# Patient Record
Sex: FEMALE | Race: WHITE | URBAN - METROPOLITAN AREA
[De-identification: names, ages, dates, MRNs, and addresses within clinical notes are randomized per-mention and may not be internally consistent; named-entity substitution may affect disease eponyms.]

---

## 2018-06-22 ENCOUNTER — PROCEDURE VISIT (OUTPATIENT)
Dept: AUDIOLOGY | Age: 83
End: 2018-06-22

## 2018-06-22 DIAGNOSIS — H90.3 SENSORINEURAL HEARING LOSS, BILATERAL: Primary | ICD-10-CM

## 2018-06-22 PROCEDURE — 99999 PR OFFICE/OUTPT VISIT,PROCEDURE ONLY: CPT | Performed by: AUDIOLOGIST

## 2018-07-05 ENCOUNTER — PROCEDURE VISIT (OUTPATIENT)
Dept: AUDIOLOGY | Age: 83
End: 2018-07-05

## 2018-07-05 DIAGNOSIS — H90.3 SENSORINEURAL HEARING LOSS (SNHL) OF BOTH EARS: Primary | ICD-10-CM

## 2018-07-05 PROCEDURE — 99999 PR OFFICE/OUTPT VISIT,PROCEDURE ONLY: CPT | Performed by: AUDIOLOGIST

## 2019-06-20 ENCOUNTER — PROCEDURE VISIT (OUTPATIENT)
Dept: AUDIOLOGY | Age: 84
End: 2019-06-20

## 2019-06-20 DIAGNOSIS — H90.3 SENSORINEURAL HEARING LOSS, BILATERAL: Primary | ICD-10-CM

## 2019-06-20 PROCEDURE — 99999 PR OFFICE/OUTPT VISIT,PROCEDURE ONLY: CPT | Performed by: AUDIOLOGIST

## 2020-07-02 ENCOUNTER — PROCEDURE VISIT (OUTPATIENT)
Dept: AUDIOLOGY | Age: 85
End: 2020-07-02

## 2020-07-02 PROCEDURE — 99999 PR OFFICE/OUTPT VISIT,PROCEDURE ONLY: CPT | Performed by: AUDIOLOGIST

## 2020-07-16 ENCOUNTER — PROCEDURE VISIT (OUTPATIENT)
Dept: AUDIOLOGY | Age: 85
End: 2020-07-16

## 2020-07-16 PROCEDURE — MISCAUD MISC AUDIOLOGY SUPPLIES: Performed by: AUDIOLOGIST

## 2020-07-16 NOTE — PROGRESS NOTES
Patient seen for hearing aid speaker change. Patient changed to semi-soft domes. No other issues noted.

## 2020-11-05 ENCOUNTER — PROCEDURE VISIT (OUTPATIENT)
Dept: AUDIOLOGY | Age: 85
End: 2020-11-05

## 2020-11-05 PROCEDURE — 99999 PR OFFICE/OUTPT VISIT,PROCEDURE ONLY: CPT | Performed by: AUDIOLOGIST

## 2020-11-05 NOTE — PROGRESS NOTES
Patient seen for hearing aid check of left hearing aid. Hearing aid is dead. Will be sent for estimate. Will contact patient with pricing when received.

## 2020-11-12 ENCOUNTER — TELEPHONE (OUTPATIENT)
Dept: AUDIOLOGY | Age: 85
End: 2020-11-12

## 2020-11-12 NOTE — TELEPHONE ENCOUNTER
Left message regarding HA repair estimate:  $200 for repair w/ 6-month warranty  $250 for repair, w/ 12-month warranty    Will wait for approval from patient.

## 2020-11-19 ENCOUNTER — PROCEDURE VISIT (OUTPATIENT)
Dept: AUDIOLOGY | Age: 85
End: 2020-11-19

## 2020-11-19 PROCEDURE — MISCAUD MISC AUDIOLOGY SUPPLIES: Performed by: AUDIOLOGIST

## 2020-11-19 NOTE — PROGRESS NOTES
Patient seen for pickup of repaired left hearing aid. No issues noted. Patient is scheduled to return 1/2021 for a hearing evaluation and HA adjustments. Billing paid in full with N.

## 2021-01-21 ENCOUNTER — TELEPHONE (OUTPATIENT)
Dept: AUDIOLOGY | Age: 86
End: 2021-01-21

## 2021-02-01 ENCOUNTER — PROCEDURE VISIT (OUTPATIENT)
Dept: AUDIOLOGY | Age: 86
End: 2021-02-01
Payer: MEDICARE

## 2021-02-01 DIAGNOSIS — H90.3 SENSORINEURAL HEARING LOSS, BILATERAL: Primary | ICD-10-CM

## 2021-02-01 PROCEDURE — 92567 TYMPANOMETRY: CPT | Performed by: AUDIOLOGIST

## 2021-02-01 PROCEDURE — 92557 COMPREHENSIVE HEARING TEST: CPT | Performed by: AUDIOLOGIST

## 2021-02-01 NOTE — PROGRESS NOTES
This patient was referred for audiometric/tympanometric testing due to bilateral hearing loss. Patient reported possible worsening of her hearing loss. She is a current hearing aid wearer. She denied any dizziness. 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 Reliability was good. Speech reception thresholds were in good agreement with the pure tone averages, bilaterally. Speech discrimination scores were excellent, bilaterally. No significant change in hearing loss since previous test.     Tympanometry revealed normal middle ear peak pressure and compliance, in the left ear. No seal obtained, in the right ear. The results were reviewed with the patient. Recommendations for follow up will be made pending physician consult. Patient reported noticing too much background noise with hearing aids. Adjusted gain. She will follow up as needed.     Jeronimo Hopson [facility]  [id number]  Electronically signed by Jeronimo Hopson on 2/1/2021 at 3:26 PM

## 2021-11-30 ENCOUNTER — TELEPHONE (OUTPATIENT)
Dept: AUDIOLOGY | Age: 86
End: 2021-11-30

## 2021-12-02 ENCOUNTER — PROCEDURE VISIT (OUTPATIENT)
Dept: AUDIOLOGY | Age: 86
End: 2021-12-02

## 2021-12-02 DIAGNOSIS — H90.3 SENSORINEURAL HEARING LOSS, BILATERAL: ICD-10-CM

## 2021-12-02 PROCEDURE — 99024 POSTOP FOLLOW-UP VISIT: CPT | Performed by: AUDIOLOGIST

## 2021-12-17 ENCOUNTER — PROCEDURE VISIT (OUTPATIENT)
Dept: AUDIOLOGY | Age: 86
End: 2021-12-17

## 2021-12-17 DIAGNOSIS — H90.3 SENSORINEURAL HEARING LOSS, BILATERAL: ICD-10-CM

## 2021-12-17 PROCEDURE — MISCAUD MISC AUDIOLOGY SUPPLIES: Performed by: AUDIOLOGIST

## 2023-08-22 ENCOUNTER — HOSPITAL ENCOUNTER (OUTPATIENT)
Age: 88
Discharge: HOME | End: 2023-08-24

## 2023-08-22 PROCEDURE — 87081 CULTURE SCREEN ONLY: CPT

## 2023-08-24 LAB
MICROORGANISM SPEC CULT: NORMAL
SPECIMEN DESCRIPTION: NORMAL

## 2023-08-30 ENCOUNTER — HOSPITAL ENCOUNTER (OUTPATIENT)
Age: 88
Discharge: HOME | End: 2023-09-01

## 2023-08-30 LAB
ABO + RH BLD: NORMAL
ARM BAND NUMBER: NORMAL
BLOOD BANK SAMPLE EXPIRATION: NORMAL
BLOOD GROUP ANTIBODIES SERPL: NEGATIVE

## 2023-08-30 PROCEDURE — 86850 RBC ANTIBODY SCREEN: CPT

## 2023-08-30 PROCEDURE — 86901 BLOOD TYPING SEROLOGIC RH(D): CPT

## 2023-08-30 PROCEDURE — 86900 BLOOD TYPING SEROLOGIC ABO: CPT

## 2023-08-31 ENCOUNTER — HOSPITAL ENCOUNTER (OUTPATIENT)
Age: 88
Discharge: HOME | End: 2023-09-02
Payer: MEDICARE

## 2023-08-31 LAB
ANION GAP SERPL CALCULATED.3IONS-SCNC: 9 MMOL/L (ref 7–16)
BUN SERPL-MCNC: 17 MG/DL (ref 6–23)
CALCIUM SERPL-MCNC: 8.5 MG/DL (ref 8.6–10.2)
CHLORIDE SERPL-SCNC: 103 MMOL/L (ref 98–107)
CO2 SERPL-SCNC: 26 MMOL/L (ref 22–29)
CREAT SERPL-MCNC: 0.7 MG/DL (ref 0.5–1)
GFR SERPL CREATININE-BSD FRML MDRD: >60 ML/MIN/1.73M2
GLUCOSE SERPL-MCNC: 99 MG/DL (ref 74–99)
HCT VFR BLD AUTO: 39.8 % (ref 34–48)
HGB BLD-MCNC: 13.1 G/DL (ref 11.5–15.5)
POTASSIUM SERPL-SCNC: 4.4 MMOL/L (ref 3.5–5)
SODIUM SERPL-SCNC: 138 MMOL/L (ref 132–146)

## 2023-08-31 PROCEDURE — 85014 HEMATOCRIT: CPT

## 2023-08-31 PROCEDURE — 85018 HEMOGLOBIN: CPT

## 2023-08-31 PROCEDURE — 80048 BASIC METABOLIC PNL TOTAL CA: CPT

## 2023-09-01 ENCOUNTER — HOSPITAL ENCOUNTER (OUTPATIENT)
Age: 88
Discharge: HOME | End: 2023-09-01

## 2023-09-01 LAB
ANION GAP SERPL CALCULATED.3IONS-SCNC: 7 MMOL/L (ref 7–16)
BUN SERPL-MCNC: 13 MG/DL (ref 6–23)
CALCIUM SERPL-MCNC: 8.8 MG/DL (ref 8.6–10.2)
CHLORIDE SERPL-SCNC: 102 MMOL/L (ref 98–107)
CO2 SERPL-SCNC: 30 MMOL/L (ref 22–29)
CREAT SERPL-MCNC: 0.6 MG/DL (ref 0.5–1)
GFR SERPL CREATININE-BSD FRML MDRD: >60 ML/MIN/1.73M2
GLUCOSE SERPL-MCNC: 109 MG/DL (ref 74–99)
HCT VFR BLD AUTO: 36.7 % (ref 34–48)
HGB BLD-MCNC: 12.1 G/DL (ref 11.5–15.5)
POTASSIUM SERPL-SCNC: 4.2 MMOL/L (ref 3.5–5)
SODIUM SERPL-SCNC: 139 MMOL/L (ref 132–146)

## 2023-09-01 PROCEDURE — 80048 BASIC METABOLIC PNL TOTAL CA: CPT

## 2023-09-01 PROCEDURE — 85018 HEMOGLOBIN: CPT

## 2023-09-01 PROCEDURE — 85014 HEMATOCRIT: CPT

## 2023-10-13 ENCOUNTER — HOSPITAL ENCOUNTER (EMERGENCY)
Age: 88
Discharge: HOME | End: 2023-10-13
Payer: MEDICARE

## 2023-10-13 VITALS
HEIGHT: 67 IN | OXYGEN SATURATION: 99 % | WEIGHT: 172 LBS | SYSTOLIC BLOOD PRESSURE: 144 MMHG | RESPIRATION RATE: 20 BRPM | TEMPERATURE: 98.7 F | BODY MASS INDEX: 27 KG/M2 | HEART RATE: 80 BPM | DIASTOLIC BLOOD PRESSURE: 67 MMHG

## 2023-10-13 DIAGNOSIS — N39.0 URINARY TRACT INFECTION WITH HEMATURIA, SITE UNSPECIFIED: Primary | ICD-10-CM

## 2023-10-13 DIAGNOSIS — R31.9 URINARY TRACT INFECTION WITH HEMATURIA, SITE UNSPECIFIED: Primary | ICD-10-CM

## 2023-10-13 LAB
BACTERIA URNS QL MICRO: ABNORMAL
BILIRUB UR QL STRIP: ABNORMAL
CLARITY UR: ABNORMAL
COLOR UR: YELLOW
GLUCOSE UR STRIP-MCNC: NEGATIVE MG/DL
HGB UR QL STRIP.AUTO: ABNORMAL
KETONES UR STRIP-MCNC: NEGATIVE MG/DL
LEUKOCYTE ESTERASE UR QL STRIP: NEGATIVE
NITRITE UR QL STRIP: NEGATIVE
PH UR STRIP: 5.5 [PH] (ref 5–9)
PROT UR STRIP-MCNC: ABNORMAL MG/DL
RBC #/AREA URNS HPF: ABNORMAL /HPF
SP GR UR STRIP: >1.03 (ref 1–1.03)
UROBILINOGEN UR STRIP-ACNC: 0.2 EU/DL (ref 0–1)
WBC #/AREA URNS HPF: ABNORMAL /HPF

## 2023-10-13 PROCEDURE — 81001 URINALYSIS AUTO W/SCOPE: CPT

## 2023-10-13 PROCEDURE — 87086 URINE CULTURE/COLONY COUNT: CPT

## 2023-10-13 PROCEDURE — 99211 OFF/OP EST MAY X REQ PHY/QHP: CPT

## 2023-10-13 PROCEDURE — 87077 CULTURE AEROBIC IDENTIFY: CPT

## 2023-10-13 RX ORDER — GABAPENTIN 300 MG/1
300 CAPSULE ORAL 2 TIMES DAILY
COMMUNITY

## 2023-10-13 RX ORDER — NITROFURANTOIN 25; 75 MG/1; MG/1
100 CAPSULE ORAL 2 TIMES DAILY
Qty: 10 CAPSULE | Refills: 0 | Status: SHIPPED | OUTPATIENT
Start: 2023-10-13 | End: 2023-10-18

## 2023-10-13 ASSESSMENT — PAIN - FUNCTIONAL ASSESSMENT: PAIN_FUNCTIONAL_ASSESSMENT: 0-10

## 2023-10-13 ASSESSMENT — PAIN SCALES - GENERAL: PAINLEVEL_OUTOF10: 0

## 2023-10-13 NOTE — DISCHARGE INSTRUCTIONS
You have microscopic blood in your urine, follow-up with your doctor when you finish the antibiotic to be sure that is resolved if not you may need further testing.

## 2023-10-13 NOTE — ED PROVIDER NOTES
non toxic and appropriate for outpatient management. She is allergic to most antibiotics she said the only thing that they can give her when she has a UTI is Macrobid. I did review her labs she does had kidney function test done in September and they were normal so I did order the [employer] [address] for her. Plan of Care/Counseling:  {Specialits:01766::I} reviewed today's visit with the {Persons; Family Members:83148} in addition to providing specific details for the plan of care and counseling regarding the diagnosis and prognosis. Questions are answered at this time and are agreeable with the plan. Assessment      1. Urinary tract infection with hematuria, site unspecified      Plan   Disposition: Discharge to home and advised to contact Butch Kaur MD  [facility] [phone]    Schedule an appointment as soon as possible for a visit      Patient condition is good    New Medications     New Prescriptions    NITROFURANTOIN, MACROCRYSTAL-MONOHYDRATE, (MACROBID) 100 MG CAPSULE    Take 1 capsule by mouth 2 times daily for 5 days     Electronically signed by ELYSSA Salgado CNP   DD: 10/13/23  **This report was transcribed using voice recognition software. Every effort was made to ensure accuracy; however, inadvertent computerized transcription errors may be present.   END OF ED PROVIDER NOTE

## 2023-10-15 LAB
MICROORGANISM SPEC CULT: ABNORMAL
SPECIMEN DESCRIPTION: ABNORMAL

## 2023-10-16 LAB
MICROORGANISM SPEC CULT: ABNORMAL
MICROORGANISM SPEC CULT: ABNORMAL
SPECIMEN DESCRIPTION: ABNORMAL

## 2023-10-16 NOTE — ED NOTES
Reviewed patients after hours culture results. Urine culture growing ESCHERICHIA COLI, patient discharged on nitrofurantoin. No need for further intervention at this time.     Joao Tao, PharmD, BCPS 10/16/2023 3:02 PM   [phone]

## 2024-09-16 ENCOUNTER — PROCEDURE VISIT (OUTPATIENT)
Dept: AUDIOLOGY | Age: 89
End: 2024-09-16

## 2024-09-16 DIAGNOSIS — H90.3 SENSORY HEARING LOSS, BILATERAL: Primary | ICD-10-CM

## 2024-09-16 PROCEDURE — 99024 POSTOP FOLLOW-UP VISIT: CPT

## 2025-01-23 ENCOUNTER — PROCEDURE VISIT (OUTPATIENT)
Dept: AUDIOLOGY | Age: 89
End: 2025-01-23

## 2025-01-23 DIAGNOSIS — H90.3 SENSORY HEARING LOSS, BILATERAL: Primary | ICD-10-CM

## 2025-01-23 NOTE — PROGRESS NOTES
Patient's son arrived to have right HA  changed. Replaced  and patient's son was taken to  to make payment of $100 for out-of-warranty  repair. Will return for services PRN.    Electronically signed by Jeronimo Higuera on 1/23/2025 at 2:39 PM

## 2025-03-17 ENCOUNTER — APPOINTMENT (OUTPATIENT)
Dept: CT IMAGING | Age: 89
DRG: 853 | End: 2025-03-17
Payer: MEDICARE

## 2025-03-17 ENCOUNTER — ANESTHESIA (OUTPATIENT)
Dept: OPERATING ROOM | Age: 89
End: 2025-03-17
Payer: MEDICARE

## 2025-03-17 ENCOUNTER — ANESTHESIA EVENT (OUTPATIENT)
Dept: OPERATING ROOM | Age: 89
End: 2025-03-17
Payer: MEDICARE

## 2025-03-17 ENCOUNTER — APPOINTMENT (OUTPATIENT)
Dept: GENERAL RADIOLOGY | Age: 89
DRG: 853 | End: 2025-03-17
Payer: MEDICARE

## 2025-03-17 ENCOUNTER — HOSPITAL ENCOUNTER (INPATIENT)
Age: 89
LOS: 9 days | Discharge: NURSING, REHAB OR LONG-TERM CARE | DRG: 853 | End: 2025-03-26
Attending: EMERGENCY MEDICINE | Admitting: STUDENT IN AN ORGANIZED HEALTH CARE EDUCATION/TRAINING PROGRAM
Payer: MEDICARE

## 2025-03-17 DIAGNOSIS — N20.0 KIDNEY STONE: ICD-10-CM

## 2025-03-17 DIAGNOSIS — B96.20 E COLI BACTEREMIA: ICD-10-CM

## 2025-03-17 DIAGNOSIS — R65.21 SEPTIC SHOCK (HCC): ICD-10-CM

## 2025-03-17 DIAGNOSIS — D69.6 THROMBOCYTOPENIA: ICD-10-CM

## 2025-03-17 DIAGNOSIS — R78.81 E COLI BACTEREMIA: ICD-10-CM

## 2025-03-17 DIAGNOSIS — R19.7 DIARRHEA, UNSPECIFIED TYPE: ICD-10-CM

## 2025-03-17 DIAGNOSIS — A41.9 SEPSIS, DUE TO UNSPECIFIED ORGANISM, UNSPECIFIED WHETHER ACUTE ORGAN DYSFUNCTION PRESENT (HCC): Primary | ICD-10-CM

## 2025-03-17 DIAGNOSIS — R79.89 ELEVATED TROPONIN LEVEL NOT DUE MYOCARDIAL INFARCTION: ICD-10-CM

## 2025-03-17 DIAGNOSIS — N28.9 ACUTE RENAL INSUFFICIENCY: ICD-10-CM

## 2025-03-17 DIAGNOSIS — N20.1 URETEROLITHIASIS: ICD-10-CM

## 2025-03-17 DIAGNOSIS — A41.9 SEPTIC SHOCK (HCC): ICD-10-CM

## 2025-03-17 DIAGNOSIS — D72.825 BANDEMIA: ICD-10-CM

## 2025-03-17 LAB
ALBUMIN SERPL-MCNC: 3.7 G/DL (ref 3.5–5.2)
ALP SERPL-CCNC: 71 U/L (ref 35–104)
ALT SERPL-CCNC: 77 U/L (ref 0–32)
ANION GAP SERPL CALCULATED.3IONS-SCNC: 14 MMOL/L (ref 7–16)
AST SERPL-CCNC: 102 U/L (ref 0–31)
BACTERIA URNS QL MICRO: ABNORMAL
BASOPHILS # BLD: 0 K/UL (ref 0–0.2)
BASOPHILS NFR BLD: 0 % (ref 0–2)
BILIRUB SERPL-MCNC: 0.5 MG/DL (ref 0–1.2)
BILIRUB UR QL STRIP: NEGATIVE
BUN SERPL-MCNC: 33 MG/DL (ref 6–23)
CALCIUM SERPL-MCNC: 9.1 MG/DL (ref 8.6–10.2)
CHLORIDE SERPL-SCNC: 103 MMOL/L (ref 98–107)
CLARITY UR: CLEAR
CO2 SERPL-SCNC: 26 MMOL/L (ref 22–29)
COLOR UR: YELLOW
CREAT SERPL-MCNC: 2.1 MG/DL (ref 0.5–1)
EKG ATRIAL RATE: 87 BPM
EKG P AXIS: 66 DEGREES
EKG P-R INTERVAL: 138 MS
EKG Q-T INTERVAL: 392 MS
EKG QRS DURATION: 84 MS
EKG QTC CALCULATION (BAZETT): 471 MS
EKG R AXIS: 14 DEGREES
EKG T AXIS: -69 DEGREES
EKG VENTRICULAR RATE: 87 BPM
EOSINOPHIL # BLD: 0 K/UL (ref 0.05–0.5)
EOSINOPHILS RELATIVE PERCENT: 0 % (ref 0–6)
ERYTHROCYTE [DISTWIDTH] IN BLOOD BY AUTOMATED COUNT: 15.7 % (ref 11.5–15)
FLUAV RNA RESP QL NAA+PROBE: NOT DETECTED
FLUBV RNA RESP QL NAA+PROBE: NOT DETECTED
GFR, ESTIMATED: 23 ML/MIN/1.73M2
GLUCOSE SERPL-MCNC: 102 MG/DL (ref 74–99)
GLUCOSE UR STRIP-MCNC: NEGATIVE MG/DL
HCT VFR BLD AUTO: 43.2 % (ref 34–48)
HGB BLD-MCNC: 14.3 G/DL (ref 11.5–15.5)
HGB UR QL STRIP.AUTO: ABNORMAL
KETONES UR STRIP-MCNC: ABNORMAL MG/DL
LACTATE BLDV-SCNC: 3.4 MMOL/L (ref 0.5–1.9)
LACTATE BLDV-SCNC: 4.1 MMOL/L (ref 0.5–2.2)
LACTATE BLDV-SCNC: 4.7 MMOL/L (ref 0.5–1.9)
LEUKOCYTE ESTERASE UR QL STRIP: NEGATIVE
LIPASE SERPL-CCNC: 17 U/L (ref 13–60)
LYMPHOCYTES NFR BLD: 0.66 K/UL (ref 1.5–4)
LYMPHOCYTES RELATIVE PERCENT: 2 % (ref 20–42)
MAGNESIUM SERPL-MCNC: 2.1 MG/DL (ref 1.6–2.6)
MCH RBC QN AUTO: 32 PG (ref 26–35)
MCHC RBC AUTO-ENTMCNC: 33.1 G/DL (ref 32–34.5)
MCV RBC AUTO: 96.6 FL (ref 80–99.9)
METAMYELOCYTES ABSOLUTE COUNT: 0.66 K/UL (ref 0–0.12)
METAMYELOCYTES: 2 % (ref 0–1)
MONOCYTES NFR BLD: 0.33 K/UL (ref 0.1–0.95)
MONOCYTES NFR BLD: 1 % (ref 2–12)
MYELOCYTES ABSOLUTE COUNT: 0.98 K/UL
MYELOCYTES: 3 %
NEUTROPHILS NFR BLD: 92 % (ref 43–80)
NEUTS SEG NFR BLD: 30.18 K/UL (ref 1.8–7.3)
NITRITE UR QL STRIP: POSITIVE
PH UR STRIP: 5.5 [PH] (ref 5–8)
PLATELET CONFIRMATION: NORMAL
PLATELET, FLUORESCENCE: 109 K/UL (ref 130–450)
PMV BLD AUTO: 9.5 FL (ref 7–12)
POTASSIUM SERPL-SCNC: 4.1 MMOL/L (ref 3.5–5)
PROT SERPL-MCNC: 5.8 G/DL (ref 6.4–8.3)
PROT UR STRIP-MCNC: >=300 MG/DL
RBC # BLD AUTO: 4.47 M/UL (ref 3.5–5.5)
RBC # BLD: ABNORMAL 10*6/UL
RBC #/AREA URNS HPF: ABNORMAL /HPF
SARS-COV-2 RNA RESP QL NAA+PROBE: NOT DETECTED
SODIUM SERPL-SCNC: 143 MMOL/L (ref 132–146)
SOURCE: NORMAL
SP GR UR STRIP: 1.02 (ref 1–1.03)
SPECIMEN DESCRIPTION: NORMAL
TROPONIN I SERPL HS-MCNC: 116 NG/L (ref 0–9)
TROPONIN I SERPL HS-MCNC: NORMAL NG/L (ref 0–14)
UROBILINOGEN UR STRIP-ACNC: 1 EU/DL (ref 0–1)
WBC #/AREA URNS HPF: ABNORMAL /HPF
WBC OTHER # BLD: 32.8 K/UL (ref 4.5–11.5)

## 2025-03-17 PROCEDURE — 83605 ASSAY OF LACTIC ACID: CPT

## 2025-03-17 PROCEDURE — 2580000003 HC RX 258

## 2025-03-17 PROCEDURE — 2000000000 HC ICU R&B

## 2025-03-17 PROCEDURE — 87088 URINE BACTERIA CULTURE: CPT

## 2025-03-17 PROCEDURE — 2500000003 HC RX 250 WO HCPCS

## 2025-03-17 PROCEDURE — 99291 CRITICAL CARE FIRST HOUR: CPT | Performed by: INTERNAL MEDICINE

## 2025-03-17 PROCEDURE — 6370000000 HC RX 637 (ALT 250 FOR IP)

## 2025-03-17 PROCEDURE — 87150 DNA/RNA AMPLIFIED PROBE: CPT

## 2025-03-17 PROCEDURE — 3700000001 HC ADD 15 MINUTES (ANESTHESIA): Performed by: UROLOGY

## 2025-03-17 PROCEDURE — 3600000013 HC SURGERY LEVEL 3 ADDTL 15MIN: Performed by: UROLOGY

## 2025-03-17 PROCEDURE — 93005 ELECTROCARDIOGRAM TRACING: CPT

## 2025-03-17 PROCEDURE — 80053 COMPREHEN METABOLIC PANEL: CPT

## 2025-03-17 PROCEDURE — 06HY33Z INSERTION OF INFUSION DEVICE INTO LOWER VEIN, PERCUTANEOUS APPROACH: ICD-10-PCS | Performed by: INTERNAL MEDICINE

## 2025-03-17 PROCEDURE — 3E043XZ INTRODUCTION OF VASOPRESSOR INTO CENTRAL VEIN, PERCUTANEOUS APPROACH: ICD-10-PCS | Performed by: INTERNAL MEDICINE

## 2025-03-17 PROCEDURE — 93010 ELECTROCARDIOGRAM REPORT: CPT | Performed by: INTERNAL MEDICINE

## 2025-03-17 PROCEDURE — 6360000002 HC RX W HCPCS: Performed by: STUDENT IN AN ORGANIZED HEALTH CARE EDUCATION/TRAINING PROGRAM

## 2025-03-17 PROCEDURE — 2580000003 HC RX 258: Performed by: STUDENT IN AN ORGANIZED HEALTH CARE EDUCATION/TRAINING PROGRAM

## 2025-03-17 PROCEDURE — 87899 AGENT NOS ASSAY W/OPTIC: CPT

## 2025-03-17 PROCEDURE — 6360000002 HC RX W HCPCS

## 2025-03-17 PROCEDURE — 87636 SARSCOV2 & INF A&B AMP PRB: CPT

## 2025-03-17 PROCEDURE — 81001 URINALYSIS AUTO W/SCOPE: CPT

## 2025-03-17 PROCEDURE — 86738 MYCOPLASMA ANTIBODY: CPT

## 2025-03-17 PROCEDURE — 83690 ASSAY OF LIPASE: CPT

## 2025-03-17 PROCEDURE — 7100000000 HC PACU RECOVERY - FIRST 15 MIN

## 2025-03-17 PROCEDURE — C2617 STENT, NON-COR, TEM W/O DEL: HCPCS | Performed by: UROLOGY

## 2025-03-17 PROCEDURE — 96374 THER/PROPH/DIAG INJ IV PUSH: CPT

## 2025-03-17 PROCEDURE — 6360000004 HC RX CONTRAST MEDICATION: Performed by: UROLOGY

## 2025-03-17 PROCEDURE — 87449 NOS EACH ORGANISM AG IA: CPT

## 2025-03-17 PROCEDURE — 74176 CT ABD & PELVIS W/O CONTRAST: CPT

## 2025-03-17 PROCEDURE — 83735 ASSAY OF MAGNESIUM: CPT

## 2025-03-17 PROCEDURE — 2580000003 HC RX 258: Performed by: INTERNAL MEDICINE

## 2025-03-17 PROCEDURE — C1758 CATHETER, URETERAL: HCPCS | Performed by: UROLOGY

## 2025-03-17 PROCEDURE — 85025 COMPLETE CBC W/AUTO DIFF WBC: CPT

## 2025-03-17 PROCEDURE — 99285 EMERGENCY DEPT VISIT HI MDM: CPT

## 2025-03-17 PROCEDURE — 2500000003 HC RX 250 WO HCPCS: Performed by: UROLOGY

## 2025-03-17 PROCEDURE — 6360000002 HC RX W HCPCS: Performed by: UROLOGY

## 2025-03-17 PROCEDURE — 36556 INSERT NON-TUNNEL CV CATH: CPT

## 2025-03-17 PROCEDURE — 87077 CULTURE AEROBIC IDENTIFY: CPT

## 2025-03-17 PROCEDURE — 3600000003 HC SURGERY LEVEL 3 BASE: Performed by: UROLOGY

## 2025-03-17 PROCEDURE — 0T778DZ DILATION OF LEFT URETER WITH INTRALUMINAL DEVICE, VIA NATURAL OR ARTIFICIAL OPENING ENDOSCOPIC: ICD-10-PCS | Performed by: UROLOGY

## 2025-03-17 PROCEDURE — BT1F1ZZ FLUOROSCOPY OF LEFT KIDNEY, URETER AND BLADDER USING LOW OSMOLAR CONTRAST: ICD-10-PCS | Performed by: UROLOGY

## 2025-03-17 PROCEDURE — 2709999900 HC NON-CHARGEABLE SUPPLY: Performed by: UROLOGY

## 2025-03-17 PROCEDURE — 99223 1ST HOSP IP/OBS HIGH 75: CPT | Performed by: STUDENT IN AN ORGANIZED HEALTH CARE EDUCATION/TRAINING PROGRAM

## 2025-03-17 PROCEDURE — 7100000001 HC PACU RECOVERY - ADDTL 15 MIN

## 2025-03-17 PROCEDURE — 3700000000 HC ANESTHESIA ATTENDED CARE: Performed by: UROLOGY

## 2025-03-17 PROCEDURE — 87040 BLOOD CULTURE FOR BACTERIA: CPT

## 2025-03-17 PROCEDURE — 70450 CT HEAD/BRAIN W/O DYE: CPT

## 2025-03-17 PROCEDURE — 87086 URINE CULTURE/COLONY COUNT: CPT

## 2025-03-17 PROCEDURE — 6370000000 HC RX 637 (ALT 250 FOR IP): Performed by: STUDENT IN AN ORGANIZED HEALTH CARE EDUCATION/TRAINING PROGRAM

## 2025-03-17 PROCEDURE — 87081 CULTURE SCREEN ONLY: CPT

## 2025-03-17 PROCEDURE — C1769 GUIDE WIRE: HCPCS | Performed by: UROLOGY

## 2025-03-17 PROCEDURE — 84484 ASSAY OF TROPONIN QUANT: CPT

## 2025-03-17 DEVICE — URETERAL STENT
Type: IMPLANTABLE DEVICE | Status: FUNCTIONAL
Brand: PERCUFLEX™

## 2025-03-17 RX ORDER — ONDANSETRON 2 MG/ML
4 INJECTION INTRAMUSCULAR; INTRAVENOUS EVERY 6 HOURS PRN
Status: DISCONTINUED | OUTPATIENT
Start: 2025-03-17 | End: 2025-03-26 | Stop reason: HOSPADM

## 2025-03-17 RX ORDER — MIDODRINE HYDROCHLORIDE 10 MG/1
10 TABLET ORAL 2 TIMES DAILY WITH MEALS
Status: DISCONTINUED | OUTPATIENT
Start: 2025-03-17 | End: 2025-03-19

## 2025-03-17 RX ORDER — GABAPENTIN 300 MG/1
300 CAPSULE ORAL 2 TIMES DAILY
Status: DISCONTINUED | OUTPATIENT
Start: 2025-03-17 | End: 2025-03-26 | Stop reason: HOSPADM

## 2025-03-17 RX ORDER — IOPAMIDOL 510 MG/ML
INJECTION, SOLUTION INTRAVASCULAR PRN
Status: DISCONTINUED | OUTPATIENT
Start: 2025-03-17 | End: 2025-03-17 | Stop reason: HOSPADM

## 2025-03-17 RX ORDER — M-VIT,TX,IRON,MINS/CALC/FOLIC 27MG-0.4MG
1 TABLET ORAL DAILY
Status: DISCONTINUED | OUTPATIENT
Start: 2025-03-18 | End: 2025-03-26 | Stop reason: HOSPADM

## 2025-03-17 RX ORDER — FENTANYL CITRATE 50 UG/ML
INJECTION, SOLUTION INTRAMUSCULAR; INTRAVENOUS
Status: DISCONTINUED | OUTPATIENT
Start: 2025-03-17 | End: 2025-03-17 | Stop reason: SDUPTHER

## 2025-03-17 RX ORDER — ENOXAPARIN SODIUM 100 MG/ML
30 INJECTION SUBCUTANEOUS DAILY
Status: DISCONTINUED | OUTPATIENT
Start: 2025-03-17 | End: 2025-03-18

## 2025-03-17 RX ORDER — NOREPINEPHRINE BITARTRATE 0.06 MG/ML
1-100 INJECTION, SOLUTION INTRAVENOUS CONTINUOUS
Status: DISCONTINUED | OUTPATIENT
Start: 2025-03-17 | End: 2025-03-20

## 2025-03-17 RX ORDER — ONDANSETRON 4 MG/1
4 TABLET, ORALLY DISINTEGRATING ORAL EVERY 8 HOURS PRN
Status: DISCONTINUED | OUTPATIENT
Start: 2025-03-17 | End: 2025-03-26 | Stop reason: HOSPADM

## 2025-03-17 RX ORDER — ACETAMINOPHEN 650 MG/1
650 SUPPOSITORY RECTAL EVERY 6 HOURS PRN
Status: DISCONTINUED | OUTPATIENT
Start: 2025-03-17 | End: 2025-03-26 | Stop reason: HOSPADM

## 2025-03-17 RX ORDER — SODIUM CHLORIDE, SODIUM LACTATE, POTASSIUM CHLORIDE, CALCIUM CHLORIDE 600; 310; 30; 20 MG/100ML; MG/100ML; MG/100ML; MG/100ML
INJECTION, SOLUTION INTRAVENOUS
Status: DISCONTINUED | OUTPATIENT
Start: 2025-03-17 | End: 2025-03-17 | Stop reason: SDUPTHER

## 2025-03-17 RX ORDER — HYDROCORTISONE SODIUM SUCCINATE 100 MG/2ML
50 INJECTION INTRAMUSCULAR; INTRAVENOUS EVERY 8 HOURS
Status: DISCONTINUED | OUTPATIENT
Start: 2025-03-17 | End: 2025-03-18

## 2025-03-17 RX ORDER — GLUCAGON 1 MG/ML
1 KIT INJECTION PRN
Status: DISCONTINUED | OUTPATIENT
Start: 2025-03-17 | End: 2025-03-26 | Stop reason: HOSPADM

## 2025-03-17 RX ORDER — SODIUM CHLORIDE 0.9 % (FLUSH) 0.9 %
5-40 SYRINGE (ML) INJECTION EVERY 12 HOURS SCHEDULED
Status: DISCONTINUED | OUTPATIENT
Start: 2025-03-17 | End: 2025-03-22

## 2025-03-17 RX ORDER — 0.9 % SODIUM CHLORIDE 0.9 %
500 INTRAVENOUS SOLUTION INTRAVENOUS ONCE
Status: COMPLETED | OUTPATIENT
Start: 2025-03-17 | End: 2025-03-17

## 2025-03-17 RX ORDER — MORPHINE SULFATE 2 MG/ML
1 INJECTION, SOLUTION INTRAMUSCULAR; INTRAVENOUS
Status: COMPLETED | OUTPATIENT
Start: 2025-03-17 | End: 2025-03-18

## 2025-03-17 RX ORDER — DEXTROSE, SODIUM CHLORIDE, SODIUM LACTATE, POTASSIUM CHLORIDE, AND CALCIUM CHLORIDE 5; .6; .31; .03; .02 G/100ML; G/100ML; G/100ML; G/100ML; G/100ML
INJECTION, SOLUTION INTRAVENOUS CONTINUOUS
Status: DISCONTINUED | OUTPATIENT
Start: 2025-03-17 | End: 2025-03-22

## 2025-03-17 RX ORDER — MAGNESIUM SULFATE IN WATER 40 MG/ML
2000 INJECTION, SOLUTION INTRAVENOUS PRN
Status: DISCONTINUED | OUTPATIENT
Start: 2025-03-17 | End: 2025-03-26 | Stop reason: HOSPADM

## 2025-03-17 RX ORDER — SODIUM CHLORIDE 0.9 % (FLUSH) 0.9 %
5-40 SYRINGE (ML) INJECTION PRN
Status: DISCONTINUED | OUTPATIENT
Start: 2025-03-17 | End: 2025-03-26 | Stop reason: HOSPADM

## 2025-03-17 RX ORDER — PROPOFOL 10 MG/ML
INJECTION, EMULSION INTRAVENOUS
Status: DISCONTINUED | OUTPATIENT
Start: 2025-03-17 | End: 2025-03-17 | Stop reason: SDUPTHER

## 2025-03-17 RX ORDER — SODIUM CHLORIDE 9 MG/ML
INJECTION, SOLUTION INTRAVENOUS PRN
Status: DISCONTINUED | OUTPATIENT
Start: 2025-03-17 | End: 2025-03-26 | Stop reason: HOSPADM

## 2025-03-17 RX ORDER — 0.9 % SODIUM CHLORIDE 0.9 %
1000 INTRAVENOUS SOLUTION INTRAVENOUS ONCE
Status: COMPLETED | OUTPATIENT
Start: 2025-03-17 | End: 2025-03-17

## 2025-03-17 RX ORDER — DEXTROSE MONOHYDRATE 100 MG/ML
INJECTION, SOLUTION INTRAVENOUS CONTINUOUS PRN
Status: DISCONTINUED | OUTPATIENT
Start: 2025-03-17 | End: 2025-03-26 | Stop reason: HOSPADM

## 2025-03-17 RX ORDER — POTASSIUM CHLORIDE 1500 MG/1
40 TABLET, EXTENDED RELEASE ORAL PRN
Status: DISCONTINUED | OUTPATIENT
Start: 2025-03-17 | End: 2025-03-26 | Stop reason: HOSPADM

## 2025-03-17 RX ORDER — ACETAMINOPHEN 325 MG/1
650 TABLET ORAL EVERY 6 HOURS PRN
Status: DISCONTINUED | OUTPATIENT
Start: 2025-03-17 | End: 2025-03-26 | Stop reason: HOSPADM

## 2025-03-17 RX ORDER — POTASSIUM CHLORIDE 7.45 MG/ML
10 INJECTION INTRAVENOUS PRN
Status: DISCONTINUED | OUTPATIENT
Start: 2025-03-17 | End: 2025-03-26 | Stop reason: HOSPADM

## 2025-03-17 RX ORDER — POLYETHYLENE GLYCOL 3350 17 G/17G
17 POWDER, FOR SOLUTION ORAL DAILY PRN
Status: DISCONTINUED | OUTPATIENT
Start: 2025-03-17 | End: 2025-03-26 | Stop reason: HOSPADM

## 2025-03-17 RX ORDER — LATANOPROST 50 UG/ML
1 SOLUTION/ DROPS OPHTHALMIC NIGHTLY
Status: DISCONTINUED | OUTPATIENT
Start: 2025-03-17 | End: 2025-03-26 | Stop reason: HOSPADM

## 2025-03-17 RX ADMIN — SODIUM CHLORIDE 1000 ML: 0.9 INJECTION, SOLUTION INTRAVENOUS at 13:21

## 2025-03-17 RX ADMIN — MIDODRINE HYDROCHLORIDE 10 MG: 10 TABLET ORAL at 21:23

## 2025-03-17 RX ADMIN — HYDROCORTISONE SODIUM SUCCINATE 50 MG: 100 INJECTION, POWDER, FOR SOLUTION INTRAMUSCULAR; INTRAVENOUS at 19:55

## 2025-03-17 RX ADMIN — SODIUM CHLORIDE, SODIUM LACTATE, POTASSIUM CHLORIDE, CALCIUM CHLORIDE AND DEXTROSE MONOHYDRATE 250 ML: 5; 600; 310; 30; 20 INJECTION, SOLUTION INTRAVENOUS at 19:54

## 2025-03-17 RX ADMIN — SODIUM CHLORIDE 500 ML: 9 INJECTION, SOLUTION INTRAVENOUS at 20:51

## 2025-03-17 RX ADMIN — LATANOPROST 1 DROP: 50 SOLUTION OPHTHALMIC at 21:18

## 2025-03-17 RX ADMIN — CEFEPIME 2000 MG: 2 INJECTION, POWDER, FOR SOLUTION INTRAVENOUS at 17:12

## 2025-03-17 RX ADMIN — SODIUM CHLORIDE 1000 ML: 0.9 INJECTION, SOLUTION INTRAVENOUS at 16:31

## 2025-03-17 RX ADMIN — CEFEPIME 2000 MG: 2 INJECTION, POWDER, FOR SOLUTION INTRAVENOUS at 16:34

## 2025-03-17 RX ADMIN — GABAPENTIN 300 MG: 300 CAPSULE ORAL at 21:23

## 2025-03-17 RX ADMIN — PROPOFOL INJECTABLE EMULSION 30 MG: 10 INJECTION, EMULSION INTRAVENOUS at 17:02

## 2025-03-17 RX ADMIN — ENOXAPARIN SODIUM 30 MG: 100 INJECTION SUBCUTANEOUS at 18:36

## 2025-03-17 RX ADMIN — PROPOFOL INJECTABLE EMULSION 30 MG: 10 INJECTION, EMULSION INTRAVENOUS at 17:16

## 2025-03-17 RX ADMIN — SODIUM CHLORIDE, POTASSIUM CHLORIDE, SODIUM LACTATE AND CALCIUM CHLORIDE: 600; 310; 30; 20 INJECTION, SOLUTION INTRAVENOUS at 17:00

## 2025-03-17 RX ADMIN — FENTANYL CITRATE 50 MCG: 50 INJECTION, SOLUTION INTRAMUSCULAR; INTRAVENOUS at 17:15

## 2025-03-17 RX ADMIN — SODIUM CHLORIDE, PRESERVATIVE FREE 10 ML: 5 INJECTION INTRAVENOUS at 21:20

## 2025-03-17 RX ADMIN — MORPHINE SULFATE 1 MG: 2 INJECTION, SOLUTION INTRAMUSCULAR; INTRAVENOUS at 18:21

## 2025-03-17 RX ADMIN — FENTANYL CITRATE 50 MCG: 50 INJECTION, SOLUTION INTRAMUSCULAR; INTRAVENOUS at 17:02

## 2025-03-17 RX ADMIN — SODIUM CHLORIDE, PRESERVATIVE FREE 40 MG: 5 INJECTION INTRAVENOUS at 19:55

## 2025-03-17 RX ADMIN — PROPOFOL INJECTABLE EMULSION 30 MCG/KG/MIN: 10 INJECTION, EMULSION INTRAVENOUS at 17:11

## 2025-03-17 RX ADMIN — SODIUM CHLORIDE, SODIUM LACTATE, POTASSIUM CHLORIDE, CALCIUM CHLORIDE AND DEXTROSE MONOHYDRATE: 5; 600; 310; 30; 20 INJECTION, SOLUTION INTRAVENOUS at 20:27

## 2025-03-17 ASSESSMENT — PAIN DESCRIPTION - DESCRIPTORS: DESCRIPTORS: ACHING;JABBING;SPASM

## 2025-03-17 ASSESSMENT — PAIN SCALES - GENERAL: PAINLEVEL_OUTOF10: 10

## 2025-03-17 ASSESSMENT — PAIN DESCRIPTION - LOCATION: LOCATION: BACK

## 2025-03-17 ASSESSMENT — PAIN DESCRIPTION - ORIENTATION: ORIENTATION: MID

## 2025-03-17 ASSESSMENT — LIFESTYLE VARIABLES
HOW MANY STANDARD DRINKS CONTAINING ALCOHOL DO YOU HAVE ON A TYPICAL DAY: PATIENT DOES NOT DRINK
HOW OFTEN DO YOU HAVE A DRINK CONTAINING ALCOHOL: NEVER

## 2025-03-17 NOTE — ANESTHESIA PRE PROCEDURE
Department of Anesthesiology  Preprocedure Note       Name:  Dianne Narayan   Age:  89 y.o.  :  1935                                          MRN:  [id number]         Date:  3/17/2025      Surgeon: Surgeon(s):  Daljit Gomez MD    Procedure: Procedure(s):  CYSTOSCOPY RETROGRADE PYELOGRAM LEFT STENT INSERTION    Medications prior to admission:   Prior to Admission medications    Medication Sig Start Date End Date Taking? Authorizing Provider   gabapentin (NEURONTIN) 300 MG capsule Take 1 capsule by mouth 2 times daily.    Martina Salgado MD   fluticasone (FLONASE) 50 MCG/ACT nasal spray 2 sprays by Nasal route daily  Patient not taking: Reported on 10/13/2023 6/13/16   William Andre DO   latanoprost (XALATAN) 0.005 % ophthalmic solution 1 drop nightly    Martina Salgado MD   losartan-hydrochlorothiazide (HYZAAR) 100-12.5 MG per tablet Take 1 tablet by mouth daily    Martina Salgado MD   OXYGEN Inhale into the lungs nightly  Patient not taking: Reported on 10/13/2023    Martina Salgado MD   Tacrolimus (PROTOPIC EX) Apply topically as needed    Martina Salgado MD   Cholecalciferol (VITAMIN D3) 2000 UNITS CAPS Take 1,000 Units by mouth daily  Patient not taking: Reported on 10/13/2023    Martina Salgado MD   Multiple Vitamins-Minerals (THERAPEUTIC MULTIVITAMIN-MINERALS) tablet Take 1 tablet by mouth daily    Martina Salgado MD   Multiple Vitamins-Minerals (OCUVITE PO) Take by mouth 2 times daily    Martina Salgado MD   fluticasone-salmeterol (ADVAIR DISKUS) 100-50 MCG/DOSE AEPB Inhale into the lungs 2 times daily   Patient not taking: Reported on 10/13/2023    Martina Salgado MD   montelukast (SINGULAIR) 10 MG tablet Take 10 mg by mouth nightly  Patient not taking: Reported on 10/13/2023    Martina Salgado MD       Current medications:    Current Facility-Administered Medications   Medication Dose Route Frequency Provider Last Rate Last

## 2025-03-17 NOTE — H&P
[facility] Hospitalist Group History and Physical      CHIEF COMPLAINT: Left flank pain associated with nausea, vomiting and diarrhea    History of Present Illness: A 89-year-old female with past medical history of hypertension, trigeminal neuralgia and peripheral neuropathy who presents from home with acute onset of left flank pain which started yesterday around 5 PM associated with chills and multiple episodes of nausea, vomiting and diarrhea.  Patient denies any chest pain or shortness of breath.    ED course:  Patient was hypotensive 95/42.  Labs noted with creatinine 2.1, lactic acid 4.7 and WBC 32.8.  Blood and urine culture collected and patient received 1 dose of cefepime.  CT abdomen pelvis noted with obstructing 8 mm proximal to mid left ureteral calculus.  Patient received IV fluid boluses and the plan for cystoscopy with left ureteral stent placement then the patient will admitted to the ICU.    Informant(s) for H&P: Patient,  and son at bedside.    REVIEW OF SYSTEMS:  A comprehensive review of systems was negative except for: what is in the HPI      PMH:  Past Medical History:   Diagnosis Date    Asthma     Brain tumor (benign) (HCC)     colloid cyst    Glaucoma     Hypertension     Trigeminal neuralgia        Surgical History:  Past Surgical History:   Procedure Laterality Date    APPENDECTOMY      HYSTERECTOMY (CERVIX STATUS UNKNOWN)      JOINT REPLACEMENT      OTHER SURGICAL HISTORY      balloon compression for trigeminal neuralgia x 4    TONSILLECTOMY         Medications Prior to Admission:    Prior to Admission medications    Medication Sig Start Date End Date Taking? Authorizing Provider   gabapentin (NEURONTIN) 300 MG capsule Take 1 capsule by mouth 2 times daily.    Provider, MD Martina   fluticasone (FLONASE) 50 MCG/ACT nasal spray 2 sprays by Nasal route daily  Patient not taking: Reported on 10/13/2023 6/13/16   William Andre DO   latanoprost (XALATAN) 0.005 % ophthalmic

## 2025-03-17 NOTE — OP NOTE
Operative Note      Patient: Dianne Narayan  YOB: 1935  MRN: [id number]    Date of Procedure: 3/17/2025    Pre-Op Diagnosis Codes:      * Kidney stone [N20.0], left proximal ureteral stone    Post-Op Diagnosis: Same       Procedure(s):  CYSTOSCOPY RETROGRADE PYELOGRAM LEFT STENT INSERTION    Surgeon(s):  Daljit Gomez MD    Assistant:   * No surgical staff found *    Anesthesia: Monitor Anesthesia Care    Estimated Blood Loss (mL): Minimal    Complications: None    Specimens:   ID Type Source Tests Collected by Time Destination   1 :  Urine Urine, Cystoscopic  Daljit Gomez MD 3/17/2025 1722        Implants:  Implant Name Type Inv. Item Serial No.  Lot No. LRB No. Used Action   STENT URET L26CM OD48FR PERCFLX DBL PGTL FLX TIP THRD W O - [id number]  STENT URET L26CM OD48FR PERCFLX DBL PGTL FLX TIP THRD W O  [facility]- [id number] Left 1 Implanted         Drains: * No LDAs found *        INDICATION FOR PROCEDURE: Dianne Narayan is a 89 y.o. who  was found to have a ureteral stone with hydronephrosis and signs of sepsis including tachycardia, AODRE.  The patient is to undergo cystoscopy with stent insertion urgently.  She understands the risks, benefits, alternatives of the procedure as well as the fact that the stone will not be treated today, signed informed consent and agrees to proceed.    PROCEDURE:   The patient was brought into the operating room and placed under anesthesia in the dorsal lithotomy position. She was prepped and draped in a sterile fashion. A 21-Bur[place]ian cystoscope with a 30-degree lens was passed through the urethra and into the bladder.  The entire length of the urethra was examined and found to be without strictures or other abnormalities. The entire bladder mucosal surface was examined under 30-degree endoscopy and found to be without calculi, tumors, diverticula, or other abnormalities. The ureteral orifices were normal in size, number, and

## 2025-03-17 NOTE — CONSULTS
CONSULTATION NOTE :ID     Patient - Dianne Narayan,  Age - 89 y.o.    - 1935      Room Number - IC04/IC04-01   [facility] -  [id number]   [facility] # - [id number]  Date of Admission -  3/17/2025 12:33 PM  Patient's PCP: Velasquez Marshall MD     Requesting Physician: Carol Manzanares MD    HISTORY OF PRESENT ILLNESS   This is a 89 y.o. female who was admitted on 3/17/2025   to the hospital with a chief complaints of   Chief Complaint   Patient presents with    Fatigue     Started yesterday afternoon with diarrhea. Patient is hypotensive and hypoxic.     Diarrhea     ID was consulted on 25 for antibiotic management   UNKnonw to ID   Wbc32.8 cr2.1   UA nitrite bacteria   Seen by Urology   S/p CYSTOSCOPY RETROGRADE PYELOGRAM LEFT STENT INSERTION   Fluoro For Surgical Procedures  Result Date: 3/17/2025  Intraprocedural fluoroscopic spot images as above.  See separate procedure report for more information.     CT HEAD WO CONTRAST  Result Date: 3/17/2025  There is no evidence of acute intracranial abnormality. Obstructing 8 mm proximal to mid left ureteral calculus. Descending and sigmoid colonic diverticulosis, without evidence of diverticulitis. Small amount of ascites in the pericolic gutters. Cholelithiasis, without evidence of acute cholecystitis.     CT ABDOMEN PELVIS WO CONTRAST Additional Contrast? None  Result Date: 3/17/2025  There is no evidence of acute intracranial abnormality. Obstructing 8 mm proximal to mid left ureteral calculus. Descending and sigmoid colonic diverticulosis, without evidence of diverticulitis. Small amount of ascites in the pericolic gutters. Cholelithiasis, without evidence of acute cholecystitis.       3/17 CYSTOSCOPY RETROGRADE PYELOGRAM LEFT STENT INSERTION     Current antibiotics     [START ON 3/18/2025] cefepime (MAXIPIME) 1,000 mg in sodium chloride 0.9 % 50 mL IVPB (addEASE), Q24H    Pt was seen postop in ICU awake responds to

## 2025-03-17 NOTE — CONSULTS
[facility]  [address] [id number]  [phone]  FAX [phone]        REASON FOR CONSULTATION:      Left proximal ureteral stone, sepsis    HISTORY OF PRESENT ILLNESS:      The patient is a 89 y.o. female patient who presents with sepsis.  Has loose stool.  Found on CT scan to have 8 mm left proximal ureteral stone.  To have central line per ER team and to OR stat.  No flank pain currently.        Past Medical History:   Diagnosis Date    Asthma     Brain tumor (benign) (HCC)     colloid cyst    Glaucoma     Hypertension     Trigeminal neuralgia          Past Surgical History:   Procedure Laterality Date    APPENDECTOMY      HYSTERECTOMY (CERVIX STATUS UNKNOWN)      JOINT REPLACEMENT      OTHER SURGICAL HISTORY      balloon compression for trigeminal neuralgia x 4    TONSILLECTOMY         Medications Prior to Admission:    Not in a hospital admission.    Allergies:    Latex, Penicillins, Asa [aspirin], Iodides, Lamictal [lamotrigine], Lincocin [lincomycin hcl], Mydriacyl [tropicamide], Other, Tegretol [carbamazepine], Torsemide, Achromycin [tetracycline], Bactrim [sulfamethoxazole-trimethoprim], Ciprofloxacin, Codeine, Erythromycin, Ketoconazole, and Motrin [ibuprofen]    Social History:    reports that she has never smoked. She has never used smokeless tobacco. She reports current alcohol use. She reports that she does not use drugs.    Family History:   Non-contributory to this Urological problem  family history is not on file.    REVIEW OF SYSTEMS:  Hypotension  Respiratory: negative for cough and hemoptysis  Cardiovascular: negative for chest pain and dyspnea  Gastrointestinal: loose stool  Derm: negative for rash and skin lesion(s)  Neurological: negative for seizures and tremors  Endocrine: negative for diabetic symptoms including polydipsia and polyuria  : As above in the HPI, otherwise negative  All other systems negative    PHYSICAL EXAM:    Vitals:  BP (!)

## 2025-03-17 NOTE — ED PROVIDER NOTES
SOB.    My findings: Dianne Narayan is a 89 y.o. female whom is in no distress. Physical exam reveals dry mucous membranes.  Heart RRR, lungs CTA, abdomen is soft and nontender. No pallor appreciated.  No peripheral edema.    My plan: Symptomatic and supportive care. Will evaluate with labs, imaging and cultures.    Electronically signed by Abbey Rosado DO on 3/17/25 at 1:56 PM EDT       [JS]   1452 Lactate, Sepsis(!!):    Lactic Acid, Sepsis 4.7(!!)  Elevated, will repeat after IV fluids [CP]   1452 CBC with Auto Differential(!):    WBC 32.8(!)   RBC 4.47   Hemoglobin Quant 14.3   Hematocrit 43.2   MCV 96.6   MCH 32.0   MCHC 33.1   RDW 15.7(!)   MPV 9.5   Platelet, Fluorescence 109(!)   Neutrophils % PENDING   Lymphocyte % PENDING   Monocytes % PENDING   Eosinophils % PENDING   Basophils % PENDING   Immature Granulocytes % PENDING   Metamyelocytes PENDING   Myelocyte Percent PENDING   Promyelocytes Percent PENDING   Blasts PENDING   Atypical Lymphocytes PENDING   Plasma Cells PENDING   Other Cell PENDING   Nucleated Red Blood Cells PENDING   Neutrophils Absolute PENDING   Lymphocytes Absolute PENDING   Monocytes Absolute PENDING   Eosinophils Absolute PENDING   Basophils Absolute PENDING   Immature Granulocytes Absolute PENDING   Absolute Bands PENDING   Metamyelocytes Absolute PENDING   Myelocytes Absolute PENDING   Promyelocytes Absolute PENDING   Blasts Absolute PENDING   Atypical Lymphocytes Absolute PENDING   ABSOLUTE PLASMA CELLS PENDING   WBC Morphology PENDING   RBC Morphology PENDING   Platelet Estimate PENDING  Leukocytosis at 32.8 [CP]   1452 CMP(!):    Sodium 143   Potassium 4.1   Chloride 103   CARBON DIOXIDE 26   Anion Gap 14   Glucose 102(!)   BUN,BUNPL 33(!)   Creatinine 2.1(!)   Est, Glom Filt Rate 23(!)   Calcium 9.1   Total Protein 5.8(!)   Albumin 3.7   Total Bilirubin 0.5   Alkaline Phosphatase 71   ALT 77(!)   (!)  ADORE with creatinine at 2.1; no significant electrolyte

## 2025-03-18 ENCOUNTER — APPOINTMENT (OUTPATIENT)
Age: 89
DRG: 853 | End: 2025-03-18
Payer: MEDICARE

## 2025-03-18 ENCOUNTER — APPOINTMENT (OUTPATIENT)
Dept: GENERAL RADIOLOGY | Age: 89
DRG: 853 | End: 2025-03-18
Payer: MEDICARE

## 2025-03-18 PROBLEM — N28.9 ACUTE RENAL INSUFFICIENCY: Status: ACTIVE | Noted: 2025-03-18

## 2025-03-18 PROBLEM — G50.0 TRIGEMINAL NEURALGIA: Status: ACTIVE | Noted: 2025-03-18

## 2025-03-18 PROBLEM — R19.7 DIARRHEA: Status: ACTIVE | Noted: 2025-03-18

## 2025-03-18 PROBLEM — H40.9 GLAUCOMA: Status: ACTIVE | Noted: 2025-03-18

## 2025-03-18 PROBLEM — N17.9 ACUTE KIDNEY INJURY: Status: ACTIVE | Noted: 2025-03-18

## 2025-03-18 PROBLEM — N20.1 URETEROLITHIASIS: Status: ACTIVE | Noted: 2025-03-18

## 2025-03-18 PROBLEM — N20.0 KIDNEY STONE: Status: ACTIVE | Noted: 2025-03-18

## 2025-03-18 PROBLEM — J45.21 INTERMITTENT ASTHMA WITH ACUTE EXACERBATION: Status: ACTIVE | Noted: 2025-03-18

## 2025-03-18 PROBLEM — D69.6 THROMBOCYTOPENIA: Status: ACTIVE | Noted: 2025-03-18

## 2025-03-18 PROBLEM — R79.89 ELEVATED TROPONIN LEVEL NOT DUE MYOCARDIAL INFARCTION: Status: ACTIVE | Noted: 2025-03-18

## 2025-03-18 PROBLEM — D72.825 BANDEMIA: Status: ACTIVE | Noted: 2025-03-18

## 2025-03-18 PROBLEM — R74.01 TRANSAMINITIS: Status: ACTIVE | Noted: 2025-03-18

## 2025-03-18 PROBLEM — E87.20 LACTIC ACID ACIDOSIS: Status: ACTIVE | Noted: 2025-03-18

## 2025-03-18 PROBLEM — D33.2 BENIGN NEOPLASM OF BRAIN (HCC): Status: ACTIVE | Noted: 2025-03-18

## 2025-03-18 LAB
ALBUMIN SERPL-MCNC: 3.1 G/DL (ref 3.5–5.2)
ALP SERPL-CCNC: 100 U/L (ref 35–104)
ALT SERPL-CCNC: 61 U/L (ref 0–32)
ANION GAP SERPL CALCULATED.3IONS-SCNC: 13 MMOL/L (ref 7–16)
AST SERPL-CCNC: 67 U/L (ref 0–31)
B PARAP IS1001 DNA NPH QL NAA+NON-PROBE: NOT DETECTED
B PERT DNA SPEC QL NAA+PROBE: NOT DETECTED
BASOPHILS # BLD: 0 K/UL (ref 0–0.2)
BASOPHILS NFR BLD: 0 % (ref 0–2)
BILIRUB DIRECT SERPL-MCNC: <0.2 MG/DL (ref 0–0.3)
BILIRUB INDIRECT SERPL-MCNC: ABNORMAL MG/DL (ref 0–1)
BILIRUB SERPL-MCNC: 0.4 MG/DL (ref 0–1.2)
BUN SERPL-MCNC: 46 MG/DL (ref 6–23)
C PNEUM DNA NPH QL NAA+NON-PROBE: NOT DETECTED
CALCIUM SERPL-MCNC: 8 MG/DL (ref 8.6–10.2)
CHLORIDE SERPL-SCNC: 108 MMOL/L (ref 98–107)
CO2 SERPL-SCNC: 23 MMOL/L (ref 22–29)
CREAT SERPL-MCNC: 2.7 MG/DL (ref 0.5–1)
ECHO AV AREA PEAK VELOCITY: 2.2 CM2
ECHO AV AREA VTI: 2.5 CM2
ECHO AV AREA/BSA PEAK VELOCITY: 1.2 CM2/M2
ECHO AV AREA/BSA VTI: 1.3 CM2/M2
ECHO AV CUSP MM: 1.5 CM
ECHO AV MEAN GRADIENT: 4 MMHG
ECHO AV MEAN VELOCITY: 1 M/S
ECHO AV PEAK GRADIENT: 7 MMHG
ECHO AV PEAK VELOCITY: 1.4 M/S
ECHO AV VELOCITY RATIO: 0.57
ECHO AV VTI: 29.9 CM
ECHO BSA: 1.87 M2
ECHO EST RA PRESSURE: 15 MMHG
ECHO LA DIAMETER INDEX: 2.13 CM/M2
ECHO LA DIAMETER: 4 CM
ECHO LA VOL A-L A2C: 52 ML (ref 22–52)
ECHO LA VOL A-L A4C: 66 ML (ref 22–52)
ECHO LA VOL BP: 57 ML (ref 22–52)
ECHO LA VOL MOD A2C: 49 ML (ref 22–52)
ECHO LA VOL MOD A4C: 55 ML (ref 22–52)
ECHO LA VOL/BSA BIPLANE: 30 ML/M2 (ref 16–34)
ECHO LA VOLUME AREA LENGTH: 65 ML
ECHO LA VOLUME INDEX A-L A2C: 28 ML/M2 (ref 16–34)
ECHO LA VOLUME INDEX A-L A4C: 35 ML/M2 (ref 16–34)
ECHO LA VOLUME INDEX AREA LENGTH: 35 ML/M2 (ref 16–34)
ECHO LA VOLUME INDEX MOD A2C: 26 ML/M2 (ref 16–34)
ECHO LA VOLUME INDEX MOD A4C: 29 ML/M2 (ref 16–34)
ECHO LV EDV A2C: 105 ML
ECHO LV EDV A4C: 131 ML
ECHO LV EDV BP: 123 ML (ref 56–104)
ECHO LV EDV INDEX A4C: 70 ML/M2
ECHO LV EDV INDEX BP: 65 ML/M2
ECHO LV EDV NDEX A2C: 56 ML/M2
ECHO LV EF PHYSICIAN: 55 %
ECHO LV EJECTION FRACTION A2C: 57 %
ECHO LV EJECTION FRACTION A4C: 44 %
ECHO LV EJECTION FRACTION BIPLANE: 52 % (ref 55–100)
ECHO LV ESV A2C: 45 ML
ECHO LV ESV A4C: 74 ML
ECHO LV ESV BP: 59 ML (ref 19–49)
ECHO LV ESV INDEX A2C: 24 ML/M2
ECHO LV ESV INDEX A4C: 39 ML/M2
ECHO LV ESV INDEX BP: 31 ML/M2
ECHO LV FRACTIONAL SHORTENING: 24 % (ref 28–44)
ECHO LV INTERNAL DIMENSION DIASTOLE INDEX: 2.39 CM/M2
ECHO LV INTERNAL DIMENSION DIASTOLIC: 4.5 CM (ref 3.9–5.3)
ECHO LV INTERNAL DIMENSION SYSTOLIC INDEX: 1.81 CM/M2
ECHO LV INTERNAL DIMENSION SYSTOLIC: 3.4 CM
ECHO LV ISOVOLUMETRIC RELAXATION TIME (IVRT): 102.8 MS
ECHO LV IVSD: 1.1 CM (ref 0.6–0.9)
ECHO LV MASS 2D: 142.9 G (ref 67–162)
ECHO LV MASS INDEX 2D: 76 G/M2 (ref 43–95)
ECHO LV POSTERIOR WALL DIASTOLIC: 0.8 CM (ref 0.6–0.9)
ECHO LV RELATIVE WALL THICKNESS RATIO: 0.36
ECHO LVOT AREA: 3.8 CM2
ECHO LVOT AV VTI INDEX: 0.67
ECHO LVOT DIAM: 2.2 CM
ECHO LVOT MEAN GRADIENT: 2 MMHG
ECHO LVOT PEAK GRADIENT: 2 MMHG
ECHO LVOT PEAK VELOCITY: 0.8 M/S
ECHO LVOT STROKE VOLUME INDEX: 40.4 ML/M2
ECHO LVOT SV: 76 ML
ECHO LVOT VTI: 20 CM
ECHO MV A VELOCITY: 0.66 M/S
ECHO MV AREA PHT: 3.5 CM2
ECHO MV AREA VTI: 3.8 CM2
ECHO MV E DECELERATION TIME (DT): 152.8 MS
ECHO MV E VELOCITY: 0.84 M/S
ECHO MV E/A RATIO: 1.27
ECHO MV LVOT VTI INDEX: 1.01
ECHO MV MAX VELOCITY: 0.8 M/S
ECHO MV MEAN GRADIENT: 1 MMHG
ECHO MV MEAN VELOCITY: 0.5 M/S
ECHO MV PEAK GRADIENT: 3 MMHG
ECHO MV PRESSURE HALF TIME (PHT): 62.7 MS
ECHO MV VTI: 20.1 CM
ECHO PV MAX VELOCITY: 0.9 M/S
ECHO PV MEAN GRADIENT: 2 MMHG
ECHO PV MEAN VELOCITY: 0.7 M/S
ECHO PV PEAK GRADIENT: 3 MMHG
ECHO PV VTI: 17.8 CM
ECHO PVEIN A DURATION: 121.8 MS
ECHO PVEIN A VELOCITY: 0.4 M/S
ECHO PVEIN PEAK D VELOCITY: 0.3 M/S
ECHO PVEIN PEAK S VELOCITY: 0.4 M/S
ECHO PVEIN S/D RATIO: 1.3
ECHO RIGHT VENTRICULAR SYSTOLIC PRESSURE (RVSP): 41 MMHG
ECHO RV INTERNAL DIMENSION: 3.7 CM
ECHO RV LONGITUDINAL DIMENSION: 6.8 CM
ECHO RV MID DIMENSION: 2.5 CM
ECHO RV TAPSE: 2 CM (ref 1.7–?)
ECHO TV REGURGITANT MAX VELOCITY: 2.57 M/S
ECHO TV REGURGITANT PEAK GRADIENT: 26 MMHG
EOSINOPHIL # BLD: 0 K/UL (ref 0.05–0.5)
EOSINOPHILS RELATIVE PERCENT: 0 % (ref 0–6)
ERYTHROCYTE [DISTWIDTH] IN BLOOD BY AUTOMATED COUNT: 16 % (ref 11.5–15)
FLUAV RNA NPH QL NAA+NON-PROBE: NOT DETECTED
FLUBV RNA NPH QL NAA+NON-PROBE: NOT DETECTED
GFR, ESTIMATED: 17 ML/MIN/1.73M2
GLUCOSE SERPL-MCNC: 150 MG/DL (ref 74–99)
HADV DNA NPH QL NAA+NON-PROBE: NOT DETECTED
HCOV 229E RNA NPH QL NAA+NON-PROBE: NOT DETECTED
HCOV HKU1 RNA NPH QL NAA+NON-PROBE: NOT DETECTED
HCOV NL63 RNA NPH QL NAA+NON-PROBE: NOT DETECTED
HCOV OC43 RNA NPH QL NAA+NON-PROBE: NOT DETECTED
HCT VFR BLD AUTO: 36.7 % (ref 34–48)
HGB BLD-MCNC: 11.8 G/DL (ref 11.5–15.5)
HMPV RNA NPH QL NAA+NON-PROBE: NOT DETECTED
HPIV1 RNA NPH QL NAA+NON-PROBE: NOT DETECTED
HPIV2 RNA NPH QL NAA+NON-PROBE: NOT DETECTED
HPIV3 RNA NPH QL NAA+NON-PROBE: NOT DETECTED
HPIV4 RNA NPH QL NAA+NON-PROBE: NOT DETECTED
L PNEUMO1 AG UR QL IA.RAPID: NEGATIVE
LACTATE BLDV-SCNC: 2 MMOL/L (ref 0.5–2.2)
LACTATE BLDV-SCNC: 2.4 MMOL/L (ref 0.5–2.2)
LACTATE BLDV-SCNC: 2.8 MMOL/L (ref 0.5–2.2)
LACTATE BLDV-SCNC: 3.7 MMOL/L (ref 0.5–2.2)
LYMPHOCYTES NFR BLD: 0.37 K/UL (ref 1.5–4)
LYMPHOCYTES RELATIVE PERCENT: 1 % (ref 20–42)
M PNEUMO DNA NPH QL NAA+NON-PROBE: NOT DETECTED
MAGNESIUM SERPL-MCNC: 1.9 MG/DL (ref 1.6–2.6)
MCH RBC QN AUTO: 31.6 PG (ref 26–35)
MCHC RBC AUTO-ENTMCNC: 32.2 G/DL (ref 32–34.5)
MCV RBC AUTO: 98.1 FL (ref 80–99.9)
METAMYELOCYTES ABSOLUTE COUNT: 2.92 K/UL (ref 0–0.12)
METAMYELOCYTES: 8 % (ref 0–1)
MONOCYTES NFR BLD: 0.37 K/UL (ref 0.1–0.95)
MONOCYTES NFR BLD: 1 % (ref 2–12)
MYELOCYTES ABSOLUTE COUNT: 0.37 K/UL
MYELOCYTES: 1 %
NEUTROPHILS NFR BLD: 89 % (ref 43–80)
NEUTS SEG NFR BLD: 32.49 K/UL (ref 1.8–7.3)
PHOSPHATE SERPL-MCNC: 5.2 MG/DL (ref 2.5–4.5)
PLATELET CONFIRMATION: NORMAL
PLATELET, FLUORESCENCE: 79 K/UL (ref 130–450)
PMV BLD AUTO: 10.6 FL (ref 7–12)
POTASSIUM SERPL-SCNC: 4.4 MMOL/L (ref 3.5–5)
PROT SERPL-MCNC: 5.1 G/DL (ref 6.4–8.3)
RBC # BLD AUTO: 3.74 M/UL (ref 3.5–5.5)
RSV RNA NPH QL NAA+NON-PROBE: NOT DETECTED
RV+EV RNA NPH QL NAA+NON-PROBE: NOT DETECTED
S PNEUM AG SPEC QL: NEGATIVE
SARS-COV-2 RNA NPH QL NAA+NON-PROBE: NOT DETECTED
SODIUM SERPL-SCNC: 144 MMOL/L (ref 132–146)
SPECIMEN DESCRIPTION: NORMAL
SPECIMEN SOURCE: NORMAL
TROPONIN I SERPL HS-MCNC: 117 NG/L (ref 0–9)
TROPONIN I SERPL HS-MCNC: 136 NG/L (ref 0–9)
WBC OTHER # BLD: 36.5 K/UL (ref 4.5–11.5)

## 2025-03-18 PROCEDURE — 6360000002 HC RX W HCPCS: Performed by: STUDENT IN AN ORGANIZED HEALTH CARE EDUCATION/TRAINING PROGRAM

## 2025-03-18 PROCEDURE — 2580000003 HC RX 258: Performed by: INTERNAL MEDICINE

## 2025-03-18 PROCEDURE — 93306 TTE W/DOPPLER COMPLETE: CPT

## 2025-03-18 PROCEDURE — 84100 ASSAY OF PHOSPHORUS: CPT

## 2025-03-18 PROCEDURE — 2500000003 HC RX 250 WO HCPCS: Performed by: UROLOGY

## 2025-03-18 PROCEDURE — 2580000003 HC RX 258: Performed by: SPECIALIST

## 2025-03-18 PROCEDURE — 2580000003 HC RX 258

## 2025-03-18 PROCEDURE — 2580000003 HC RX 258: Performed by: STUDENT IN AN ORGANIZED HEALTH CARE EDUCATION/TRAINING PROGRAM

## 2025-03-18 PROCEDURE — 6370000000 HC RX 637 (ALT 250 FOR IP): Performed by: UROLOGY

## 2025-03-18 PROCEDURE — 6360000002 HC RX W HCPCS: Performed by: UROLOGY

## 2025-03-18 PROCEDURE — 6360000002 HC RX W HCPCS: Performed by: INTERNAL MEDICINE

## 2025-03-18 PROCEDURE — 84484 ASSAY OF TROPONIN QUANT: CPT

## 2025-03-18 PROCEDURE — 2580000003 HC RX 258: Performed by: UROLOGY

## 2025-03-18 PROCEDURE — 82248 BILIRUBIN DIRECT: CPT

## 2025-03-18 PROCEDURE — 0202U NFCT DS 22 TRGT SARS-COV-2: CPT

## 2025-03-18 PROCEDURE — 99233 SBSQ HOSP IP/OBS HIGH 50: CPT | Performed by: STUDENT IN AN ORGANIZED HEALTH CARE EDUCATION/TRAINING PROGRAM

## 2025-03-18 PROCEDURE — 83605 ASSAY OF LACTIC ACID: CPT

## 2025-03-18 PROCEDURE — 80053 COMPREHEN METABOLIC PANEL: CPT

## 2025-03-18 PROCEDURE — 2500000003 HC RX 250 WO HCPCS

## 2025-03-18 PROCEDURE — 36592 COLLECT BLOOD FROM PICC: CPT

## 2025-03-18 PROCEDURE — 83735 ASSAY OF MAGNESIUM: CPT

## 2025-03-18 PROCEDURE — 6360000002 HC RX W HCPCS

## 2025-03-18 PROCEDURE — 6360000002 HC RX W HCPCS: Performed by: SPECIALIST

## 2025-03-18 PROCEDURE — 71045 X-RAY EXAM CHEST 1 VIEW: CPT

## 2025-03-18 PROCEDURE — 2000000000 HC ICU R&B

## 2025-03-18 PROCEDURE — 99291 CRITICAL CARE FIRST HOUR: CPT | Performed by: INTERNAL MEDICINE

## 2025-03-18 PROCEDURE — 93306 TTE W/DOPPLER COMPLETE: CPT | Performed by: INTERNAL MEDICINE

## 2025-03-18 PROCEDURE — 85025 COMPLETE CBC W/AUTO DIFF WBC: CPT

## 2025-03-18 RX ORDER — LOSARTAN POTASSIUM 100 MG/1
100 TABLET ORAL DAILY
Status: ON HOLD | COMMUNITY
End: 2025-03-25 | Stop reason: HOSPADM

## 2025-03-18 RX ORDER — DIPHENHYDRAMINE HYDROCHLORIDE 50 MG/ML
25 INJECTION, SOLUTION INTRAMUSCULAR; INTRAVENOUS ONCE
Status: COMPLETED | OUTPATIENT
Start: 2025-03-18 | End: 2025-03-18

## 2025-03-18 RX ORDER — HEPARIN SODIUM 5000 [USP'U]/ML
5000 INJECTION, SOLUTION INTRAVENOUS; SUBCUTANEOUS 2 TIMES DAILY
Status: DISCONTINUED | OUTPATIENT
Start: 2025-03-18 | End: 2025-03-19

## 2025-03-18 RX ORDER — HEPARIN SODIUM 5000 [USP'U]/ML
5000 INJECTION, SOLUTION INTRAVENOUS; SUBCUTANEOUS 2 TIMES DAILY
Status: DISCONTINUED | OUTPATIENT
Start: 2025-03-18 | End: 2025-03-18

## 2025-03-18 RX ADMIN — SODIUM CHLORIDE: 9 INJECTION, SOLUTION INTRAVENOUS at 00:38

## 2025-03-18 RX ADMIN — LATANOPROST 1 DROP: 50 SOLUTION OPHTHALMIC at 20:51

## 2025-03-18 RX ADMIN — CEFEPIME 1000 MG: 1 INJECTION, POWDER, FOR SOLUTION INTRAMUSCULAR; INTRAVENOUS at 08:41

## 2025-03-18 RX ADMIN — SODIUM CHLORIDE, PRESERVATIVE FREE 40 MG: 5 INJECTION INTRAVENOUS at 08:50

## 2025-03-18 RX ADMIN — NOREPINEPHRINE BITARTRATE 5 MCG/MIN: 64 SOLUTION INTRAVENOUS at 00:41

## 2025-03-18 RX ADMIN — SODIUM CHLORIDE, PRESERVATIVE FREE 10 ML: 5 INJECTION INTRAVENOUS at 17:13

## 2025-03-18 RX ADMIN — MEROPENEM 500 MG: 500 INJECTION INTRAVENOUS at 16:43

## 2025-03-18 RX ADMIN — ENOXAPARIN SODIUM 30 MG: 100 INJECTION SUBCUTANEOUS at 08:50

## 2025-03-18 RX ADMIN — DIPHENHYDRAMINE HYDROCHLORIDE 25 MG: 50 INJECTION INTRAMUSCULAR; INTRAVENOUS at 06:26

## 2025-03-18 RX ADMIN — MORPHINE SULFATE 1 MG: 2 INJECTION, SOLUTION INTRAMUSCULAR; INTRAVENOUS at 00:17

## 2025-03-18 RX ADMIN — SODIUM CHLORIDE, SODIUM LACTATE, POTASSIUM CHLORIDE, CALCIUM CHLORIDE AND DEXTROSE MONOHYDRATE: 5; 600; 310; 30; 20 INJECTION, SOLUTION INTRAVENOUS at 14:25

## 2025-03-18 RX ADMIN — SODIUM CHLORIDE, PRESERVATIVE FREE 10 ML: 5 INJECTION INTRAVENOUS at 17:16

## 2025-03-18 RX ADMIN — HEPARIN SODIUM 5000 UNITS: 5000 INJECTION INTRAVENOUS; SUBCUTANEOUS at 20:47

## 2025-03-18 RX ADMIN — SODIUM CHLORIDE, PRESERVATIVE FREE 10 ML: 5 INJECTION INTRAVENOUS at 08:55

## 2025-03-18 RX ADMIN — SODIUM CHLORIDE, PRESERVATIVE FREE 10 ML: 5 INJECTION INTRAVENOUS at 20:50

## 2025-03-18 RX ADMIN — SODIUM CHLORIDE, SODIUM LACTATE, POTASSIUM CHLORIDE, CALCIUM CHLORIDE AND DEXTROSE MONOHYDRATE: 5; 600; 310; 30; 20 INJECTION, SOLUTION INTRAVENOUS at 03:51

## 2025-03-18 RX ADMIN — HYDROCORTISONE SODIUM SUCCINATE 50 MG: 100 INJECTION, POWDER, FOR SOLUTION INTRAMUSCULAR; INTRAVENOUS at 04:27

## 2025-03-18 ASSESSMENT — PAIN SCALES - GENERAL
PAINLEVEL_OUTOF10: 4
PAINLEVEL_OUTOF10: 0
PAINLEVEL_OUTOF10: 4

## 2025-03-18 ASSESSMENT — PAIN SCALES - PAIN ASSESSMENT IN ADVANCED DEMENTIA (PAINAD)
FACIALEXPRESSION: SMILING OR INEXPRESSIVE
BODYLANGUAGE: TENSE, DISTRESSED PACING, FIDGETING
BREATHING: NORMAL
TOTALSCORE: 4
NEGVOCALIZATION: OCCASIONAL MOAN/GROAN, LOW SPEECH, NEGATIVE/DISAPPROVING QUALITY
CONSOLABILITY: UNABLE TO CONSOLE, DISTRACT OR REASSURE

## 2025-03-18 ASSESSMENT — PAIN DESCRIPTION - ORIENTATION: ORIENTATION: LEFT

## 2025-03-18 ASSESSMENT — PAIN DESCRIPTION - LOCATION: LOCATION: FLANK

## 2025-03-18 ASSESSMENT — PAIN DESCRIPTION - DESCRIPTORS: DESCRIPTORS: ACHING

## 2025-03-18 NOTE — CONSULTS
Assessment:  Urosepsis   Ureterolithiasis s/p cysto retro with Left stent  Septic shock requiring pressor support  Acute Kidney injury 2/2 stone and hypoperfusion   Lactic acidosis   Elevated troponin most likely ischemic demand  Transaminitis  Leukocytosis with shift to the left  Thrombocytopenia  Asthma  Glaucoma  Trigeminal Neuralgia  Brain Tumor  Cholelithiasis without cholecystitis  Descending and sigmoid colonic diverticulosis     PLAN:  Fluid resuscitation: patient received 2.5 L of NSS iv Bolus  May need Levophed Wean to keep MAP > 65 mm HG  Lovenox sq for DVT prophylaxis  Protonix IV for GI prophylaxis  MRSA nares, COVID PCR, sputum culture, blood cultures x2, UA, UC, legionella, strep antigen, IGM mycoplasma   Daily BMP, MG, phos, CBC  Q6 lactic and troponin  Home meds per attending  Continue Cefepime 1gm until cultures result then deescalate   D5LR @100 cc/hr  ECHO  Daily Chest x-ray and Abg's   DISPOSITION:  ICU  CODE STATUS:  FULL CODE    History of Present Illness: Patient is a 89 y.o. female with the following medical Problems: Asthma, Gabriel tumor (colloid cyst), glaucoma, Hypertension, Trigeminal neuralgia.  Patient presented to the ED 3/17 with acute onset of left flank pain which started last night around 1700, associated with chills, nausea, vomiting and diarrhea.  ED workup showed patient to be hypotensive, with a creatine of 2.1, lactic of 4.7, and White count of 32.8.  Further investigation showed CT of abdomen and pelvis noted having a 8 mm obstructing proximal to mid left ureteral calculus.  Patient received a fluid bolus, a dose of Maxipime and was scheduled for a cysto Retro pyelogram with left stent insertion by Dr Gomez.  Admitted to the ICU for closer monitoring and severe sepsis and Septic Shock.       CT Abdomen/CT Head-There is no evidence of acute intracranial abnormality.Obstructing 8 mm proximal to mid left ureteral calculus.   Descending and sigmoid colonic diverticulosis,

## 2025-03-18 NOTE — ANESTHESIA POSTPROCEDURE EVALUATION
Department of Anesthesiology  Postprocedure Note    Patient: Dianne Narayan  MRN: [id number]  YOB: 1935  Date of evaluation: 3/17/2025    Procedure Summary       Date: 03/17/25 Room / Location: [address]    Anesthesia Start: 1658 Anesthesia Stop: 1739    Procedure: CYSTOSCOPY RETROGRADE PYELOGRAM LEFT STENT INSERTION (Left: Bladder) Diagnosis:       Kidney stone      (Kidney stone [N20.0])    Surgeons: Daljit Gomez MD Responsible Provider: Mignon Yin DO    Anesthesia Type: MAC ASA Status: 3 - Emergent            Anesthesia Type: MAC    Berna Phase I: Berna Score: 9    Berna Phase II:      Anesthesia Post Evaluation    Patient location during evaluation: ICU  Patient participation: complete - patient participated  Level of consciousness: awake  Airway patency: patent  Nausea & Vomiting: no nausea and no vomiting  Cardiovascular status: hemodynamically stable  Respiratory status: acceptable  Hydration status: euvolemic  Pain management: adequate        No notable events documented.

## 2025-03-18 NOTE — CONSULTS
ICU Intensivist Attestation:    I saw, examined, and discussed the patient with Monie ASIF and agree with her assessment and plan.    The patient arrived just several minutes ago in the intensive care unit and I have been in the room to assess her.  Have also reviewed her charts and labs as she has returned from having her kidney stone removed from the left ureter and a stent placed.  Blood cultures are not yet identifiably back but she is covered appropriately with cefepime.      The patient is being given septic shock doses of steroids and we have added fluids.  She is modestly hypotensive and is on a modest dose of norepinephrine which we will try to wean when she receives enough fluid and her sepsis is treated and she is given hydrocortisone for septic shock.  Lactate level is modestly elevated but we are going to trend this over the next 24 hours.    For now, I do not anticipate difficulty in weaning her from pressors and I do not suspect she will need intubation or significant amounts of oxygen.  Chest x-ray will be obtained in the morning.    Electronically signed by John Oro MD on 3/18/2025 at 9:33 AM  Assessment:  Urosepsis   Ureterolithiasis s/p cysto retro with Left stent  Septic shock requiring pressor support  Acute Kidney injury 2/2 stone and hypoperfusion   Lactic acidosis   Elevated troponin most likely ischemic demand  Transaminitis  Leukocytosis with shift to the left  Thrombocytopenia  Asthma  Glaucoma  Trigeminal Neuralgia  Brain Tumor    PLAN:  Fluid resuscitation: patient received 2.5 L of NSS iv Bolus  May need Levophed Wean to keep MAP > 65 mm HG  Lovenox sq for DVT prophylaxis  Protonix IV for GI prophylaxis  MRSA nares, COVID PCR, sputum culture, blood cultures x2, UA, UC, legionella, strep antigen, IGM mycoplasma   Daily BMP, MG, phos, CBC  Q6 lactic and troponin  Home meds per attending  Continue Cefepime 1gm until cultures result then deescalate   D5LR @100 cc hr  Daily

## 2025-03-18 NOTE — CARE COORDINATION
Case Management Assessment  Initial Evaluation    Date/Time of Evaluation: 3/18/2025 9:12 AM  Assessment Completed by: Ellyn Shore RN    If patient is discharged prior to next notation, then this note serves as note for discharge by case management.    Patient Name: Dianne Narayan                   YOB: 1935  Diagnosis: Ureterolithiasis [N20.1]  Acute renal insufficiency [N28.9]  Septic shock (HCC) [A41.9, R65.21]  Diarrhea, unspecified type [R19.7]  Sepsis, due to unspecified organism, unspecified whether acute organ dysfunction present (HCC) [A41.9]                   Date / Time: 3/17/2025 12:33 PM    Patient Admission Status: Inpatient   Readmission Risk (Low < 19, Mod (19-27), High > 27): Readmission Risk Score: 12.4    Current PCP: Velasquez Marshall MD  PCP verified by CM? Yes    Chart Reviewed: Yes      History Provided by: Significant Other ( Iker)  Patient Orientation: Unable to Assess, Other (see comment) ([facility] sitter at bedside)    Patient Cognition: Alert    Hospitalization in the last 30 days (Readmission):  No    If yes, Readmission Assessment in  Navigator will be completed.    Advance Directives:      Code Status: Full Code   Patient's Primary Decision Maker is: Legal Next of Kin    Primary Decision Maker: Iker Narayan - Spouse - [phone]    Discharge Planning:    Patient lives with: Spouse/Significant Other Type of Home: House  Primary Care Giver: Self  Patient Support Systems include: Spouse/Significant Other, Children, Family Members   Current services prior to admission: Other (Comment) (edson)            Current DME:  cane, walker, hearing aides            Type of Home Care services:  PT (pt mentioned therapy)    ADLS  Prior functional level: Assistance with the following:, Mobility (back and balance problems-  uses a walker or cane)  Current functional level: Assistance with the following:, Mobility, Other (see comment) (walker and cane- expecting

## 2025-03-18 NOTE — ACP (ADVANCE CARE PLANNING)
Advance Care Planning   Healthcare Decision Maker:    Primary Decision Maker: Iker Narayan - Spouse - [phone]    Today we documented Decision Maker(s) consistent with Legal Next of Kin hierarchy.       Electronically signed by Ellyn Shore RN-BC on 3/18/2025 at 7:29 AM

## 2025-03-19 ENCOUNTER — APPOINTMENT (OUTPATIENT)
Dept: ULTRASOUND IMAGING | Age: 89
DRG: 853 | End: 2025-03-19
Payer: MEDICARE

## 2025-03-19 ENCOUNTER — APPOINTMENT (OUTPATIENT)
Dept: GENERAL RADIOLOGY | Age: 89
DRG: 853 | End: 2025-03-19
Payer: MEDICARE

## 2025-03-19 LAB
ALBUMIN SERPL-MCNC: 2.7 G/DL (ref 3.5–5.2)
ALP SERPL-CCNC: 111 U/L (ref 35–104)
ALT SERPL-CCNC: 53 U/L (ref 0–32)
ANION GAP SERPL CALCULATED.3IONS-SCNC: 8 MMOL/L (ref 7–16)
AST SERPL-CCNC: 51 U/L (ref 0–31)
BASOPHILS # BLD: 0.3 K/UL (ref 0–0.2)
BASOPHILS NFR BLD: 1 % (ref 0–2)
BILIRUB SERPL-MCNC: 0.4 MG/DL (ref 0–1.2)
BUN SERPL-MCNC: 58 MG/DL (ref 6–23)
CALCIUM SERPL-MCNC: 7.8 MG/DL (ref 8.6–10.2)
CHLORIDE SERPL-SCNC: 110 MMOL/L (ref 98–107)
CO2 SERPL-SCNC: 26 MMOL/L (ref 22–29)
CREAT SERPL-MCNC: 2.6 MG/DL (ref 0.5–1)
EOSINOPHIL # BLD: 0 K/UL (ref 0.05–0.5)
EOSINOPHILS RELATIVE PERCENT: 0 % (ref 0–6)
ERYTHROCYTE [DISTWIDTH] IN BLOOD BY AUTOMATED COUNT: 15.9 % (ref 11.5–15)
GFR, ESTIMATED: 18 ML/MIN/1.73M2
GLUCOSE SERPL-MCNC: 103 MG/DL (ref 74–99)
HCT VFR BLD AUTO: 35.8 % (ref 34–48)
HGB BLD-MCNC: 11.7 G/DL (ref 11.5–15.5)
LYMPHOCYTES NFR BLD: 0.6 K/UL (ref 1.5–4)
LYMPHOCYTES RELATIVE PERCENT: 2 % (ref 20–42)
MAGNESIUM SERPL-MCNC: 1.9 MG/DL (ref 1.6–2.6)
MCH RBC QN AUTO: 31.5 PG (ref 26–35)
MCHC RBC AUTO-ENTMCNC: 32.7 G/DL (ref 32–34.5)
MCV RBC AUTO: 96.2 FL (ref 80–99.9)
METAMYELOCYTES ABSOLUTE COUNT: 2.72 K/UL (ref 0–0.12)
METAMYELOCYTES: 8 % (ref 0–1)
MICROORGANISM SPEC CULT: NORMAL
MONOCYTES NFR BLD: 0.3 K/UL (ref 0.1–0.95)
MONOCYTES NFR BLD: 1 % (ref 2–12)
MYCOPLASMA AB,IGM: NORMAL
MYELOCYTES ABSOLUTE COUNT: 0.91 K/UL
MYELOCYTES: 3 %
NEUTROPHILS NFR BLD: 86 % (ref 43–80)
NEUTS SEG NFR BLD: 30.47 K/UL (ref 1.8–7.3)
PHOSPHATE SERPL-MCNC: 3.8 MG/DL (ref 2.5–4.5)
PLATELET CONFIRMATION: NORMAL
PLATELET, FLUORESCENCE: 55 K/UL (ref 130–450)
PMV BLD AUTO: 11.7 FL (ref 7–12)
POTASSIUM SERPL-SCNC: 4 MMOL/L (ref 3.5–5)
PROT SERPL-MCNC: 4.8 G/DL (ref 6.4–8.3)
RBC # BLD AUTO: 3.72 M/UL (ref 3.5–5.5)
RBC # BLD: ABNORMAL 10*6/UL
SODIUM SERPL-SCNC: 144 MMOL/L (ref 132–146)
SPECIMEN DESCRIPTION: NORMAL
WBC # BLD: ABNORMAL 10*3/UL
WBC OTHER # BLD: 35.3 K/UL (ref 4.5–11.5)

## 2025-03-19 PROCEDURE — 6370000000 HC RX 637 (ALT 250 FOR IP): Performed by: INTERNAL MEDICINE

## 2025-03-19 PROCEDURE — 93970 EXTREMITY STUDY: CPT

## 2025-03-19 PROCEDURE — 6360000002 HC RX W HCPCS: Performed by: INTERNAL MEDICINE

## 2025-03-19 PROCEDURE — 80053 COMPREHEN METABOLIC PANEL: CPT

## 2025-03-19 PROCEDURE — 99233 SBSQ HOSP IP/OBS HIGH 50: CPT | Performed by: STUDENT IN AN ORGANIZED HEALTH CARE EDUCATION/TRAINING PROGRAM

## 2025-03-19 PROCEDURE — 2580000003 HC RX 258

## 2025-03-19 PROCEDURE — 6360000002 HC RX W HCPCS: Performed by: SPECIALIST

## 2025-03-19 PROCEDURE — 6370000000 HC RX 637 (ALT 250 FOR IP)

## 2025-03-19 PROCEDURE — 2580000003 HC RX 258: Performed by: INTERNAL MEDICINE

## 2025-03-19 PROCEDURE — 97530 THERAPEUTIC ACTIVITIES: CPT | Performed by: PHYSICAL THERAPIST

## 2025-03-19 PROCEDURE — 2580000003 HC RX 258: Performed by: SPECIALIST

## 2025-03-19 PROCEDURE — P9047 ALBUMIN (HUMAN), 25%, 50ML: HCPCS | Performed by: INTERNAL MEDICINE

## 2025-03-19 PROCEDURE — 2000000000 HC ICU R&B

## 2025-03-19 PROCEDURE — 2500000003 HC RX 250 WO HCPCS: Performed by: UROLOGY

## 2025-03-19 PROCEDURE — 36592 COLLECT BLOOD FROM PICC: CPT

## 2025-03-19 PROCEDURE — 71045 X-RAY EXAM CHEST 1 VIEW: CPT

## 2025-03-19 PROCEDURE — 83735 ASSAY OF MAGNESIUM: CPT

## 2025-03-19 PROCEDURE — 85025 COMPLETE CBC W/AUTO DIFF WBC: CPT

## 2025-03-19 PROCEDURE — 97161 PT EVAL LOW COMPLEX 20 MIN: CPT | Performed by: PHYSICAL THERAPIST

## 2025-03-19 PROCEDURE — 2700000000 HC OXYGEN THERAPY PER DAY

## 2025-03-19 PROCEDURE — 84100 ASSAY OF PHOSPHORUS: CPT

## 2025-03-19 PROCEDURE — 99291 CRITICAL CARE FIRST HOUR: CPT | Performed by: INTERNAL MEDICINE

## 2025-03-19 PROCEDURE — 6360000002 HC RX W HCPCS

## 2025-03-19 RX ORDER — MIDODRINE HYDROCHLORIDE 10 MG/1
10 TABLET ORAL
Status: DISCONTINUED | OUTPATIENT
Start: 2025-03-19 | End: 2025-03-19

## 2025-03-19 RX ORDER — MIDODRINE HYDROCHLORIDE 10 MG/1
10 TABLET ORAL
Status: DISCONTINUED | OUTPATIENT
Start: 2025-03-19 | End: 2025-03-20

## 2025-03-19 RX ORDER — ALBUMIN (HUMAN) 12.5 G/50ML
25 SOLUTION INTRAVENOUS EVERY 6 HOURS
Status: COMPLETED | OUTPATIENT
Start: 2025-03-19 | End: 2025-03-19

## 2025-03-19 RX ORDER — HYDROCORTISONE SODIUM SUCCINATE 100 MG/2ML
50 INJECTION INTRAMUSCULAR; INTRAVENOUS EVERY 8 HOURS
Status: COMPLETED | OUTPATIENT
Start: 2025-03-19 | End: 2025-03-20

## 2025-03-19 RX ORDER — ALBUMIN (HUMAN) 12.5 G/50ML
25 SOLUTION INTRAVENOUS ONCE
Status: DISCONTINUED | OUTPATIENT
Start: 2025-03-19 | End: 2025-03-19

## 2025-03-19 RX ADMIN — SODIUM CHLORIDE, PRESERVATIVE FREE 10 ML: 5 INJECTION INTRAVENOUS at 16:15

## 2025-03-19 RX ADMIN — SODIUM CHLORIDE, PRESERVATIVE FREE 10 ML: 5 INJECTION INTRAVENOUS at 21:30

## 2025-03-19 RX ADMIN — HYDROCORTISONE SODIUM SUCCINATE 50 MG: 100 INJECTION, POWDER, FOR SOLUTION INTRAMUSCULAR; INTRAVENOUS at 17:01

## 2025-03-19 RX ADMIN — SODIUM CHLORIDE, PRESERVATIVE FREE 10 ML: 5 INJECTION INTRAVENOUS at 17:08

## 2025-03-19 RX ADMIN — MEROPENEM 500 MG: 500 INJECTION INTRAVENOUS at 17:05

## 2025-03-19 RX ADMIN — SODIUM CHLORIDE, SODIUM LACTATE, POTASSIUM CHLORIDE, CALCIUM CHLORIDE AND DEXTROSE MONOHYDRATE: 5; 600; 310; 30; 20 INJECTION, SOLUTION INTRAVENOUS at 10:36

## 2025-03-19 RX ADMIN — MIDODRINE HYDROCHLORIDE 10 MG: 10 TABLET ORAL at 08:01

## 2025-03-19 RX ADMIN — SODIUM CHLORIDE, PRESERVATIVE FREE 10 ML: 5 INJECTION INTRAVENOUS at 17:35

## 2025-03-19 RX ADMIN — SODIUM CHLORIDE, PRESERVATIVE FREE 40 MG: 5 INJECTION INTRAVENOUS at 09:56

## 2025-03-19 RX ADMIN — LATANOPROST 1 DROP: 50 SOLUTION OPHTHALMIC at 21:31

## 2025-03-19 RX ADMIN — HYDROCORTISONE SODIUM SUCCINATE 50 MG: 100 INJECTION, POWDER, FOR SOLUTION INTRAMUSCULAR; INTRAVENOUS at 10:02

## 2025-03-19 RX ADMIN — SODIUM CHLORIDE, PRESERVATIVE FREE 10 ML: 5 INJECTION INTRAVENOUS at 09:57

## 2025-03-19 RX ADMIN — SODIUM CHLORIDE, SODIUM LACTATE, POTASSIUM CHLORIDE, CALCIUM CHLORIDE AND DEXTROSE MONOHYDRATE: 5; 600; 310; 30; 20 INJECTION, SOLUTION INTRAVENOUS at 00:30

## 2025-03-19 RX ADMIN — MIDODRINE HYDROCHLORIDE 10 MG: 10 TABLET ORAL at 11:50

## 2025-03-19 RX ADMIN — ALBUMIN (HUMAN) 25 G: 0.25 INJECTION, SOLUTION INTRAVENOUS at 21:30

## 2025-03-19 RX ADMIN — ALBUMIN (HUMAN) 25 G: 0.25 INJECTION, SOLUTION INTRAVENOUS at 10:02

## 2025-03-19 RX ADMIN — ALBUMIN (HUMAN) 25 G: 0.25 INJECTION, SOLUTION INTRAVENOUS at 15:17

## 2025-03-19 RX ADMIN — SODIUM CHLORIDE, SODIUM LACTATE, POTASSIUM CHLORIDE, CALCIUM CHLORIDE AND DEXTROSE MONOHYDRATE: 5; 600; 310; 30; 20 INJECTION, SOLUTION INTRAVENOUS at 21:18

## 2025-03-19 RX ADMIN — MEROPENEM 500 MG: 500 INJECTION INTRAVENOUS at 05:21

## 2025-03-19 RX ADMIN — SODIUM CHLORIDE, PRESERVATIVE FREE 10 ML: 5 INJECTION INTRAVENOUS at 17:01

## 2025-03-19 ASSESSMENT — PAIN SCALES - GENERAL
PAINLEVEL_OUTOF10: 0

## 2025-03-19 NOTE — CARE COORDINATION
3/19/2025 ICU, AMS, pt sitter at bedside. IVF, Solucortef, Merrem, Protonix. Pt from home, [place]- aides with  at home. Attends outpatient physical therapy at [facility] due to chronic back and balance problems. 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 Pts family will provide a ride at discharge. May benefit from C at discharge. Electronically signed by Ellyn Shore RN-BC on 3/19/2025 at 11:28 AM

## 2025-03-20 ENCOUNTER — APPOINTMENT (OUTPATIENT)
Dept: GENERAL RADIOLOGY | Age: 89
DRG: 853 | End: 2025-03-20
Payer: MEDICARE

## 2025-03-20 LAB
ACB COMPLEX DNA BLD POS QL NAA+NON-PROBE: NOT DETECTED
ALBUMIN SERPL-MCNC: 3.6 G/DL (ref 3.5–5.2)
ALP SERPL-CCNC: 197 U/L (ref 35–104)
ALT SERPL-CCNC: 67 U/L (ref 0–32)
ANION GAP SERPL CALCULATED.3IONS-SCNC: 11 MMOL/L (ref 7–16)
AST SERPL-CCNC: 57 U/L (ref 0–31)
B FRAGILIS DNA BLD POS QL NAA+NON-PROBE: NOT DETECTED
BASOPHILS # BLD: 0 K/UL (ref 0–0.2)
BASOPHILS NFR BLD: 0 % (ref 0–2)
BILIRUB SERPL-MCNC: 1 MG/DL (ref 0–1.2)
BIOFIRE TEST COMMENT: ABNORMAL
BLACTX-M ISLT/SPM QL: NOT DETECTED
BLAIMP ISLT/SPM QL: NOT DETECTED
BLAKPC ISLT/SPM QL: NOT DETECTED
BLAOXA-48-LIKE ISLT/SPM QL: NOT DETECTED
BLAVIM ISLT/SPM QL: NOT DETECTED
BUN SERPL-MCNC: 63 MG/DL (ref 6–23)
C ALBICANS DNA BLD POS QL NAA+NON-PROBE: NOT DETECTED
C AURIS DNA BLD POS QL NAA+NON-PROBE: NOT DETECTED
C GATTII+NEOFOR DNA BLD POS QL NAA+N-PRB: NOT DETECTED
C GLABRATA DNA BLD POS QL NAA+NON-PROBE: NOT DETECTED
C KRUSEI DNA BLD POS QL NAA+NON-PROBE: NOT DETECTED
C PARAP DNA BLD POS QL NAA+NON-PROBE: NOT DETECTED
C TROPICLS DNA BLD POS QL NAA+NON-PROBE: NOT DETECTED
CALCIUM SERPL-MCNC: 8.7 MG/DL (ref 8.6–10.2)
CHLORIDE SERPL-SCNC: 107 MMOL/L (ref 98–107)
CO2 SERPL-SCNC: 24 MMOL/L (ref 22–29)
COLISTIN RES MCR-1 ISLT/SPM QL: NOT DETECTED
CREAT SERPL-MCNC: 2 MG/DL (ref 0.5–1)
E CLOAC COMP DNA BLD POS NAA+NON-PROBE: NOT DETECTED
E COLI DNA BLD POS QL NAA+NON-PROBE: DETECTED
E FAECALIS DNA BLD POS QL NAA+NON-PROBE: NOT DETECTED
E FAECIUM DNA BLD POS QL NAA+NON-PROBE: NOT DETECTED
ENTEROBACTERALES DNA BLD POS NAA+N-PRB: DETECTED
EOSINOPHIL # BLD: 0 K/UL (ref 0.05–0.5)
EOSINOPHILS RELATIVE PERCENT: 0 % (ref 0–6)
ERYTHROCYTE [DISTWIDTH] IN BLOOD BY AUTOMATED COUNT: 15.9 % (ref 11.5–15)
GFR, ESTIMATED: 24 ML/MIN/1.73M2
GLUCOSE SERPL-MCNC: 149 MG/DL (ref 74–99)
GP B STREP DNA BLD POS QL NAA+NON-PROBE: NOT DETECTED
HAEM INFLU DNA BLD POS QL NAA+NON-PROBE: NOT DETECTED
HCT VFR BLD AUTO: 34.4 % (ref 34–48)
HGB BLD-MCNC: 11.8 G/DL (ref 11.5–15.5)
K OXYTOCA DNA BLD POS QL NAA+NON-PROBE: NOT DETECTED
KLEBSIELLA SP DNA BLD POS QL NAA+NON-PRB: NOT DETECTED
KLEBSIELLA SP DNA BLD POS QL NAA+NON-PRB: NOT DETECTED
L MONOCYTOG DNA BLD POS QL NAA+NON-PROBE: NOT DETECTED
LYMPHOCYTES NFR BLD: 0.88 K/UL (ref 1.5–4)
LYMPHOCYTES RELATIVE PERCENT: 3 % (ref 20–42)
MAGNESIUM SERPL-MCNC: 2 MG/DL (ref 1.6–2.6)
MCH RBC QN AUTO: 32.3 PG (ref 26–35)
MCHC RBC AUTO-ENTMCNC: 34.3 G/DL (ref 32–34.5)
MCV RBC AUTO: 94.2 FL (ref 80–99.9)
MICROORGANISM SPEC CULT: ABNORMAL
MICROORGANISM/AGENT SPEC: ABNORMAL
MONOCYTES NFR BLD: 10 % (ref 2–12)
MONOCYTES NFR BLD: 2.92 K/UL (ref 0.1–0.95)
N MEN DNA BLD POS QL NAA+NON-PROBE: NOT DETECTED
NEUTROPHILS NFR BLD: 87 % (ref 43–80)
NEUTS SEG NFR BLD: 25.4 K/UL (ref 1.8–7.3)
P AERUGINOSA DNA BLD POS NAA+NON-PROBE: NOT DETECTED
PHOSPHATE SERPL-MCNC: 3 MG/DL (ref 2.5–4.5)
PLATELET # BLD AUTO: 44 K/UL (ref 130–450)
PLATELET CONFIRMATION: NORMAL
PMV BLD AUTO: 13 FL (ref 7–12)
POTASSIUM SERPL-SCNC: 4 MMOL/L (ref 3.5–5)
PROT SERPL-MCNC: 5.6 G/DL (ref 6.4–8.3)
PROTEUS SP DNA BLD POS QL NAA+NON-PROBE: NOT DETECTED
RBC # BLD AUTO: 3.65 M/UL (ref 3.5–5.5)
RESISTANT GENE NDM BY PCR: NOT DETECTED
S AUREUS DNA BLD POS QL NAA+NON-PROBE: NOT DETECTED
S AUREUS+CONS DNA BLD POS NAA+NON-PROBE: NOT DETECTED
S EPIDERMIDIS DNA BLD POS QL NAA+NON-PRB: NOT DETECTED
S LUGDUNENSIS DNA BLD POS QL NAA+NON-PRB: NOT DETECTED
S MALTOPHILIA DNA BLD POS QL NAA+NON-PRB: NOT DETECTED
S MARCESCENS DNA BLD POS NAA+NON-PROBE: NOT DETECTED
S PNEUM DNA BLD POS QL NAA+NON-PROBE: NOT DETECTED
S PYO DNA BLD POS QL NAA+NON-PROBE: NOT DETECTED
SALMONELLA DNA BLD POS QL NAA+NON-PROBE: NOT DETECTED
SERVICE CMNT-IMP: ABNORMAL
SERVICE CMNT-IMP: ABNORMAL
SODIUM SERPL-SCNC: 142 MMOL/L (ref 132–146)
SPECIMEN DESCRIPTION: ABNORMAL
STREPTOCOCCUS DNA BLD POS NAA+NON-PROBE: NOT DETECTED
WBC OTHER # BLD: 29.2 K/UL (ref 4.5–11.5)

## 2025-03-20 PROCEDURE — 6360000002 HC RX W HCPCS: Performed by: SPECIALIST

## 2025-03-20 PROCEDURE — 36592 COLLECT BLOOD FROM PICC: CPT

## 2025-03-20 PROCEDURE — 6360000002 HC RX W HCPCS: Performed by: INTERNAL MEDICINE

## 2025-03-20 PROCEDURE — 84100 ASSAY OF PHOSPHORUS: CPT

## 2025-03-20 PROCEDURE — 6370000000 HC RX 637 (ALT 250 FOR IP): Performed by: INTERNAL MEDICINE

## 2025-03-20 PROCEDURE — 85025 COMPLETE CBC W/AUTO DIFF WBC: CPT

## 2025-03-20 PROCEDURE — 2700000000 HC OXYGEN THERAPY PER DAY

## 2025-03-20 PROCEDURE — 97530 THERAPEUTIC ACTIVITIES: CPT

## 2025-03-20 PROCEDURE — 2000000000 HC ICU R&B

## 2025-03-20 PROCEDURE — 2580000003 HC RX 258: Performed by: INTERNAL MEDICINE

## 2025-03-20 PROCEDURE — 80053 COMPREHEN METABOLIC PANEL: CPT

## 2025-03-20 PROCEDURE — 2580000003 HC RX 258: Performed by: SPECIALIST

## 2025-03-20 PROCEDURE — 71045 X-RAY EXAM CHEST 1 VIEW: CPT

## 2025-03-20 PROCEDURE — 2580000003 HC RX 258

## 2025-03-20 PROCEDURE — 2500000003 HC RX 250 WO HCPCS: Performed by: UROLOGY

## 2025-03-20 PROCEDURE — 2500000003 HC RX 250 WO HCPCS: Performed by: INTERNAL MEDICINE

## 2025-03-20 PROCEDURE — 97165 OT EVAL LOW COMPLEX 30 MIN: CPT

## 2025-03-20 PROCEDURE — 97110 THERAPEUTIC EXERCISES: CPT

## 2025-03-20 PROCEDURE — 2500000003 HC RX 250 WO HCPCS: Performed by: SPECIALIST

## 2025-03-20 PROCEDURE — 6360000002 HC RX W HCPCS

## 2025-03-20 PROCEDURE — 99233 SBSQ HOSP IP/OBS HIGH 50: CPT | Performed by: STUDENT IN AN ORGANIZED HEALTH CARE EDUCATION/TRAINING PROGRAM

## 2025-03-20 PROCEDURE — 83735 ASSAY OF MAGNESIUM: CPT

## 2025-03-20 RX ORDER — DIPHENHYDRAMINE HYDROCHLORIDE 50 MG/ML
25 INJECTION, SOLUTION INTRAMUSCULAR; INTRAVENOUS EVERY 6 HOURS PRN
Status: DISCONTINUED | OUTPATIENT
Start: 2025-03-20 | End: 2025-03-26 | Stop reason: HOSPADM

## 2025-03-20 RX ORDER — HYDRALAZINE HYDROCHLORIDE 20 MG/ML
10 INJECTION INTRAMUSCULAR; INTRAVENOUS ONCE
Status: COMPLETED | OUTPATIENT
Start: 2025-03-20 | End: 2025-03-20

## 2025-03-20 RX ORDER — SODIUM CHLORIDE 0.9 % (FLUSH) 0.9 %
5-40 SYRINGE (ML) INJECTION EVERY 12 HOURS SCHEDULED
Status: DISCONTINUED | OUTPATIENT
Start: 2025-03-20 | End: 2025-03-26 | Stop reason: HOSPADM

## 2025-03-20 RX ORDER — AMLODIPINE BESYLATE 5 MG/1
5 TABLET ORAL DAILY
Status: DISCONTINUED | OUTPATIENT
Start: 2025-03-20 | End: 2025-03-22

## 2025-03-20 RX ADMIN — SODIUM CHLORIDE, PRESERVATIVE FREE 40 MG: 5 INJECTION INTRAVENOUS at 09:33

## 2025-03-20 RX ADMIN — Medication 10 ML: at 10:58

## 2025-03-20 RX ADMIN — HYDROCORTISONE SODIUM SUCCINATE 50 MG: 100 INJECTION, POWDER, FOR SOLUTION INTRAMUSCULAR; INTRAVENOUS at 01:08

## 2025-03-20 RX ADMIN — SODIUM CHLORIDE, PRESERVATIVE FREE 10 ML: 5 INJECTION INTRAVENOUS at 10:58

## 2025-03-20 RX ADMIN — HYDROCORTISONE SODIUM SUCCINATE 50 MG: 100 INJECTION, POWDER, FOR SOLUTION INTRAMUSCULAR; INTRAVENOUS at 09:33

## 2025-03-20 RX ADMIN — Medication 1 TABLET: at 09:34

## 2025-03-20 RX ADMIN — MEROPENEM 500 MG: 500 INJECTION INTRAVENOUS at 04:11

## 2025-03-20 RX ADMIN — SODIUM CHLORIDE, SODIUM LACTATE, POTASSIUM CHLORIDE, CALCIUM CHLORIDE AND DEXTROSE MONOHYDRATE: 5; 600; 310; 30; 20 INJECTION, SOLUTION INTRAVENOUS at 07:09

## 2025-03-20 RX ADMIN — LATANOPROST 1 DROP: 50 SOLUTION OPHTHALMIC at 21:05

## 2025-03-20 RX ADMIN — HYDRALAZINE HYDROCHLORIDE 10 MG: 20 INJECTION, SOLUTION INTRAMUSCULAR; INTRAVENOUS at 11:30

## 2025-03-20 RX ADMIN — WATER 2000 MG: 1 INJECTION INTRAMUSCULAR; INTRAVENOUS; SUBCUTANEOUS at 10:51

## 2025-03-20 RX ADMIN — SODIUM CHLORIDE, SODIUM LACTATE, POTASSIUM CHLORIDE, CALCIUM CHLORIDE AND DEXTROSE MONOHYDRATE: 5; 600; 310; 30; 20 INJECTION, SOLUTION INTRAVENOUS at 18:39

## 2025-03-20 RX ADMIN — DIPHENHYDRAMINE HYDROCHLORIDE 25 MG: 50 INJECTION INTRAMUSCULAR; INTRAVENOUS at 21:16

## 2025-03-20 ASSESSMENT — PAIN SCALES - GENERAL
PAINLEVEL_OUTOF10: 0

## 2025-03-20 NOTE — DISCHARGE INSTRUCTIONS
A ureteral stent was inserted during your recent procedure.  Unlike a heart \"stent\" which is metal, short, and permanent, this ureteral stent plastic, and temporary.  It spans from your kidney, down the ureter, and into your bladder.  This will need to be removed, so it is very important that you follow-up with your doctor.    IMPORTANT - This ureteral stent will likely cause you to experience frequent urination, urgency to urinate, back/flank pain with urination, and/or blood in the urine.  These things are very normal.  Taking the pain medications and/or anti-inflammatories will help to manage this discomfort if present.  If you have any questions or concerns you can contact [facility] office at [phone].     Ureteral Stent Placement: What to Expect at Home  Your Recovery     A ureteral (say \"you-REE-ter-ul\") stent is a thin, hollow tube that is placed in the ureter to help urine pass from the kidney into the bladder. Ureters are the tubes that connect the kidneys to the bladder.  You may have a small amount of blood in your urine for 1 to 3 days after the procedure.  While the stent is in place, you may have to urinate more often, feel a sudden need to urinate, or feel like you can't completely empty your bladder. You may feel some pain when you urinate or do strenuous activity. You also may notice a small amount of blood in your urine after strenuous activities. These side effects usually don't prevent people from doing their normal daily activities.  You may have a thin string coming out of your urethra. Your urethra is the tube that carries urine from your bladder to outside your body. This string is attached to the stent. Try not to pull on the string. It will be used to pull out the stent when you no longer need it.  After the procedure, urine may flow better from your kidneys to your bladder. A ureteral stent may be left in place for several days or for as long as several months. Your doctor will

## 2025-03-20 NOTE — PROCEDURES
PROCEDURE NOTE  Date: 3/20/2025   Name: Dianne Narayan  YOB: 1935    Procedures  Platelets less than 50 at 44. IV team will follow

## 2025-03-20 NOTE — CARE COORDINATION
3/20/2025 ICU, AMS- slightly- but no sitter needed. Urology irrigated her donis with orders for more frequent irrigations by RN. Pt has been wearing oxygen in the evenings at the hospital. CM met with son and pts . Referrals to SNF's [facility] for [place] location, [facility] for ([facility]). PRECERT needed, Needs signed ASHLEY and HENS at discharge. IV Rocephin 10 days- midline to be placed. Electronically signed by Ellyn Shore RN-BC on 3/20/2025 at 12:51 PM      The Plan for Transition of Care is related to the following treatment goals: SNF    The Patient and/or patient representative son and  was provided with a choice of provider and agrees with the discharge plan. [x] Yes [] No    Freedom of choice list was provided with basic dialogue that supports the patient's individualized plan of care/goals, treatment preferences and shares the quality data associated with the providers. [x] Yes [] No

## 2025-03-21 ENCOUNTER — APPOINTMENT (OUTPATIENT)
Dept: GENERAL RADIOLOGY | Age: 89
DRG: 853 | End: 2025-03-21
Payer: MEDICARE

## 2025-03-21 LAB
ALBUMIN SERPL-MCNC: 3.1 G/DL (ref 3.5–5.2)
ALP SERPL-CCNC: 195 U/L (ref 35–104)
ALT SERPL-CCNC: 53 U/L (ref 0–32)
ANION GAP SERPL CALCULATED.3IONS-SCNC: 9 MMOL/L (ref 7–16)
AST SERPL-CCNC: 26 U/L (ref 0–31)
BASOPHILS # BLD: 0 K/UL (ref 0–0.2)
BASOPHILS NFR BLD: 0 % (ref 0–2)
BILIRUB SERPL-MCNC: 0.6 MG/DL (ref 0–1.2)
BUN SERPL-MCNC: 61 MG/DL (ref 6–23)
CALCIUM SERPL-MCNC: 8.8 MG/DL (ref 8.6–10.2)
CHLORIDE SERPL-SCNC: 111 MMOL/L (ref 98–107)
CO2 SERPL-SCNC: 25 MMOL/L (ref 22–29)
CREAT SERPL-MCNC: 1.5 MG/DL (ref 0.5–1)
EOSINOPHIL # BLD: 0.19 K/UL (ref 0.05–0.5)
EOSINOPHILS RELATIVE PERCENT: 1 % (ref 0–6)
ERYTHROCYTE [DISTWIDTH] IN BLOOD BY AUTOMATED COUNT: 15.9 % (ref 11.5–15)
GFR, ESTIMATED: 33 ML/MIN/1.73M2
GLUCOSE SERPL-MCNC: 133 MG/DL (ref 74–99)
HCT VFR BLD AUTO: 35.3 % (ref 34–48)
HGB BLD-MCNC: 12.1 G/DL (ref 11.5–15.5)
LYMPHOCYTES NFR BLD: 1.53 K/UL (ref 1.5–4)
LYMPHOCYTES RELATIVE PERCENT: 7 % (ref 20–42)
MAGNESIUM SERPL-MCNC: 2.1 MG/DL (ref 1.6–2.6)
MCH RBC QN AUTO: 31.9 PG (ref 26–35)
MCHC RBC AUTO-ENTMCNC: 34.3 G/DL (ref 32–34.5)
MCV RBC AUTO: 93.1 FL (ref 80–99.9)
MICROORGANISM SPEC CULT: ABNORMAL
MICROORGANISM/AGENT SPEC: ABNORMAL
MONOCYTES NFR BLD: 1.15 K/UL (ref 0.1–0.95)
MONOCYTES NFR BLD: 5 % (ref 2–12)
NEUTROPHILS NFR BLD: 87 % (ref 43–80)
NEUTS SEG NFR BLD: 19.33 K/UL (ref 1.8–7.3)
PHOSPHATE SERPL-MCNC: 2.3 MG/DL (ref 2.5–4.5)
PLATELET CONFIRMATION: NORMAL
PLATELET, FLUORESCENCE: 46 K/UL (ref 130–450)
PMV BLD AUTO: 11 FL (ref 7–12)
POTASSIUM SERPL-SCNC: 3.7 MMOL/L (ref 3.5–5)
PROT SERPL-MCNC: 4.9 G/DL (ref 6.4–8.3)
RBC # BLD AUTO: 3.79 M/UL (ref 3.5–5.5)
RBC # BLD: ABNORMAL 10*6/UL
SERVICE CMNT-IMP: ABNORMAL
SODIUM SERPL-SCNC: 145 MMOL/L (ref 132–146)
SPECIMEN DESCRIPTION: ABNORMAL
WBC OTHER # BLD: 22.2 K/UL (ref 4.5–11.5)

## 2025-03-21 PROCEDURE — 83735 ASSAY OF MAGNESIUM: CPT

## 2025-03-21 PROCEDURE — 85025 COMPLETE CBC W/AUTO DIFF WBC: CPT

## 2025-03-21 PROCEDURE — 6360000002 HC RX W HCPCS

## 2025-03-21 PROCEDURE — 80053 COMPREHEN METABOLIC PANEL: CPT

## 2025-03-21 PROCEDURE — 6370000000 HC RX 637 (ALT 250 FOR IP): Performed by: SPECIALIST

## 2025-03-21 PROCEDURE — 2500000003 HC RX 250 WO HCPCS: Performed by: SPECIALIST

## 2025-03-21 PROCEDURE — 84100 ASSAY OF PHOSPHORUS: CPT

## 2025-03-21 PROCEDURE — 99291 CRITICAL CARE FIRST HOUR: CPT | Performed by: INTERNAL MEDICINE

## 2025-03-21 PROCEDURE — 6370000000 HC RX 637 (ALT 250 FOR IP): Performed by: INTERNAL MEDICINE

## 2025-03-21 PROCEDURE — 2500000003 HC RX 250 WO HCPCS

## 2025-03-21 PROCEDURE — 6360000002 HC RX W HCPCS: Performed by: SPECIALIST

## 2025-03-21 PROCEDURE — 2500000003 HC RX 250 WO HCPCS: Performed by: INTERNAL MEDICINE

## 2025-03-21 PROCEDURE — 87040 BLOOD CULTURE FOR BACTERIA: CPT

## 2025-03-21 PROCEDURE — 2000000000 HC ICU R&B

## 2025-03-21 PROCEDURE — 2580000003 HC RX 258

## 2025-03-21 PROCEDURE — 2700000000 HC OXYGEN THERAPY PER DAY

## 2025-03-21 PROCEDURE — 5A0945A ASSISTANCE WITH RESPIRATORY VENTILATION, 24-96 CONSECUTIVE HOURS, HIGH NASAL FLOW/VELOCITY: ICD-10-PCS | Performed by: INTERNAL MEDICINE

## 2025-03-21 PROCEDURE — 71045 X-RAY EXAM CHEST 1 VIEW: CPT

## 2025-03-21 PROCEDURE — 6370000000 HC RX 637 (ALT 250 FOR IP)

## 2025-03-21 PROCEDURE — 2580000003 HC RX 258: Performed by: INTERNAL MEDICINE

## 2025-03-21 RX ORDER — ACYCLOVIR 200 MG/5ML
200 SUSPENSION ORAL 3 TIMES DAILY
Status: DISCONTINUED | OUTPATIENT
Start: 2025-03-21 | End: 2025-03-26 | Stop reason: HOSPADM

## 2025-03-21 RX ADMIN — ACYCLOVIR 200 MG: 200 SUSPENSION ORAL at 21:09

## 2025-03-21 RX ADMIN — SODIUM CHLORIDE, SODIUM LACTATE, POTASSIUM CHLORIDE, CALCIUM CHLORIDE AND DEXTROSE MONOHYDRATE: 5; 600; 310; 30; 20 INJECTION, SOLUTION INTRAVENOUS at 04:57

## 2025-03-21 RX ADMIN — Medication 1 TABLET: at 08:32

## 2025-03-21 RX ADMIN — LATANOPROST 1 DROP: 50 SOLUTION OPHTHALMIC at 21:09

## 2025-03-21 RX ADMIN — POTASSIUM BICARBONATE 25 MEQ: 978 TABLET, EFFERVESCENT ORAL at 05:50

## 2025-03-21 RX ADMIN — SODIUM CHLORIDE, PRESERVATIVE FREE 40 MG: 5 INJECTION INTRAVENOUS at 08:32

## 2025-03-21 RX ADMIN — Medication 10 ML: at 21:09

## 2025-03-21 RX ADMIN — AMLODIPINE BESYLATE 5 MG: 5 TABLET ORAL at 08:32

## 2025-03-21 RX ADMIN — Medication 6 MG: at 21:09

## 2025-03-21 RX ADMIN — ACYCLOVIR 200 MG: 200 SUSPENSION ORAL at 14:57

## 2025-03-21 RX ADMIN — WATER 2000 MG: 1 INJECTION INTRAMUSCULAR; INTRAVENOUS; SUBCUTANEOUS at 10:52

## 2025-03-21 RX ADMIN — SODIUM PHOSPHATE, MONOBASIC, MONOHYDRATE AND SODIUM PHOSPHATE, DIBASIC, ANHYDROUS 20 MMOL: 276; 142 INJECTION, SOLUTION INTRAVENOUS at 06:43

## 2025-03-21 ASSESSMENT — PAIN SCALES - GENERAL
PAINLEVEL_OUTOF10: 0

## 2025-03-21 NOTE — CARE COORDINATION
3/21/2025 ICU, Rocephin, Protonix, Electrolyte replacements. Referrals to SNF's [facility] ([facility])First choice, [facility]- 2nd choice, [facility] 3rd choice. PRECERT needed, Needs signed ASHLEY and HENS at discharge. IV Rocephin 10 days- midline to be placed once platelets improve. Pastoral Care and CM following. Electronically signed by Ellyn Shore RN on 3/21/2025 at 8:33 AM

## 2025-03-22 ENCOUNTER — APPOINTMENT (OUTPATIENT)
Dept: GENERAL RADIOLOGY | Age: 89
DRG: 853 | End: 2025-03-22
Payer: MEDICARE

## 2025-03-22 LAB
ANION GAP SERPL CALCULATED.3IONS-SCNC: 7 MMOL/L (ref 7–16)
BASOPHILS # BLD: 0 K/UL (ref 0–0.2)
BASOPHILS NFR BLD: 0 % (ref 0–2)
BUN SERPL-MCNC: 49 MG/DL (ref 6–23)
CALCIUM SERPL-MCNC: 8.4 MG/DL (ref 8.6–10.2)
CHLORIDE SERPL-SCNC: 110 MMOL/L (ref 98–107)
CO2 SERPL-SCNC: 28 MMOL/L (ref 22–29)
CREAT SERPL-MCNC: 1.2 MG/DL (ref 0.5–1)
EOSINOPHIL # BLD: 0.13 K/UL (ref 0.05–0.5)
EOSINOPHILS RELATIVE PERCENT: 1 % (ref 0–6)
ERYTHROCYTE [DISTWIDTH] IN BLOOD BY AUTOMATED COUNT: 15.8 % (ref 11.5–15)
GFR, ESTIMATED: 45 ML/MIN/1.73M2
GLUCOSE SERPL-MCNC: 108 MG/DL (ref 74–99)
HCT VFR BLD AUTO: 35.3 % (ref 34–48)
HGB BLD-MCNC: 12.1 G/DL (ref 11.5–15.5)
LYMPHOCYTES NFR BLD: 1.04 K/UL (ref 1.5–4)
LYMPHOCYTES RELATIVE PERCENT: 7 % (ref 20–42)
MAGNESIUM SERPL-MCNC: 1.7 MG/DL (ref 1.6–2.6)
MCH RBC QN AUTO: 31.8 PG (ref 26–35)
MCHC RBC AUTO-ENTMCNC: 34.3 G/DL (ref 32–34.5)
MCV RBC AUTO: 92.9 FL (ref 80–99.9)
MONOCYTES NFR BLD: 16 % (ref 2–12)
MONOCYTES NFR BLD: 2.35 K/UL (ref 0.1–0.95)
MYELOCYTES ABSOLUTE COUNT: 0.13 K/UL
MYELOCYTES: 1 %
NEUTROPHILS NFR BLD: 76 % (ref 43–80)
NEUTS SEG NFR BLD: 11.35 K/UL (ref 1.8–7.3)
PHOSPHATE SERPL-MCNC: 3.4 MG/DL (ref 2.5–4.5)
PLATELET CONFIRMATION: NORMAL
PLATELET, FLUORESCENCE: 56 K/UL (ref 130–450)
PMV BLD AUTO: 10.8 FL (ref 7–12)
POTASSIUM SERPL-SCNC: 3.7 MMOL/L (ref 3.5–5)
RBC # BLD AUTO: 3.8 M/UL (ref 3.5–5.5)
RBC # BLD: ABNORMAL 10*6/UL
SODIUM SERPL-SCNC: 145 MMOL/L (ref 132–146)
WBC OTHER # BLD: 15 K/UL (ref 4.5–11.5)

## 2025-03-22 PROCEDURE — 6370000000 HC RX 637 (ALT 250 FOR IP): Performed by: INTERNAL MEDICINE

## 2025-03-22 PROCEDURE — 6360000002 HC RX W HCPCS: Performed by: SPECIALIST

## 2025-03-22 PROCEDURE — 6360000002 HC RX W HCPCS

## 2025-03-22 PROCEDURE — C1751 CATH, INF, PER/CENT/MIDLINE: HCPCS

## 2025-03-22 PROCEDURE — 36410 VNPNXR 3YR/> PHY/QHP DX/THER: CPT

## 2025-03-22 PROCEDURE — 2500000003 HC RX 250 WO HCPCS: Performed by: INTERNAL MEDICINE

## 2025-03-22 PROCEDURE — 2580000003 HC RX 258: Performed by: INTERNAL MEDICINE

## 2025-03-22 PROCEDURE — 6370000000 HC RX 637 (ALT 250 FOR IP): Performed by: SPECIALIST

## 2025-03-22 PROCEDURE — 2500000003 HC RX 250 WO HCPCS: Performed by: SPECIALIST

## 2025-03-22 PROCEDURE — 2580000003 HC RX 258

## 2025-03-22 PROCEDURE — 99291 CRITICAL CARE FIRST HOUR: CPT | Performed by: INTERNAL MEDICINE

## 2025-03-22 PROCEDURE — 80048 BASIC METABOLIC PNL TOTAL CA: CPT

## 2025-03-22 PROCEDURE — 85025 COMPLETE CBC W/AUTO DIFF WBC: CPT

## 2025-03-22 PROCEDURE — 86022 PLATELET ANTIBODIES: CPT

## 2025-03-22 PROCEDURE — 84100 ASSAY OF PHOSPHORUS: CPT

## 2025-03-22 PROCEDURE — 2700000000 HC OXYGEN THERAPY PER DAY

## 2025-03-22 PROCEDURE — 71045 X-RAY EXAM CHEST 1 VIEW: CPT

## 2025-03-22 PROCEDURE — 2060000000 HC ICU INTERMEDIATE R&B

## 2025-03-22 PROCEDURE — 6370000000 HC RX 637 (ALT 250 FOR IP)

## 2025-03-22 PROCEDURE — 83735 ASSAY OF MAGNESIUM: CPT

## 2025-03-22 PROCEDURE — 6360000002 HC RX W HCPCS: Performed by: INTERNAL MEDICINE

## 2025-03-22 PROCEDURE — 76937 US GUIDE VASCULAR ACCESS: CPT

## 2025-03-22 PROCEDURE — 05HC33Z INSERTION OF INFUSION DEVICE INTO LEFT BASILIC VEIN, PERCUTANEOUS APPROACH: ICD-10-PCS | Performed by: INTERNAL MEDICINE

## 2025-03-22 RX ORDER — FONDAPARINUX SODIUM 2.5 MG/.5ML
2.5 INJECTION SUBCUTANEOUS DAILY
Status: DISCONTINUED | OUTPATIENT
Start: 2025-03-22 | End: 2025-03-26 | Stop reason: HOSPADM

## 2025-03-22 RX ORDER — SODIUM CHLORIDE 450 MG/100ML
INJECTION, SOLUTION INTRAVENOUS CONTINUOUS
Status: DISCONTINUED | OUTPATIENT
Start: 2025-03-22 | End: 2025-03-24

## 2025-03-22 RX ORDER — AMLODIPINE BESYLATE 5 MG/1
5 TABLET ORAL ONCE
Status: COMPLETED | OUTPATIENT
Start: 2025-03-22 | End: 2025-03-22

## 2025-03-22 RX ORDER — PANTOPRAZOLE SODIUM 40 MG/1
40 TABLET, DELAYED RELEASE ORAL
Status: DISCONTINUED | OUTPATIENT
Start: 2025-03-23 | End: 2025-03-26 | Stop reason: HOSPADM

## 2025-03-22 RX ORDER — POLYETHYLENE GLYCOL 3350 17 G/17G
17 POWDER, FOR SOLUTION ORAL 2 TIMES DAILY
Status: DISCONTINUED | OUTPATIENT
Start: 2025-03-22 | End: 2025-03-26 | Stop reason: HOSPADM

## 2025-03-22 RX ORDER — DOCUSATE SODIUM 100 MG/1
100 CAPSULE, LIQUID FILLED ORAL 2 TIMES DAILY
Status: DISCONTINUED | OUTPATIENT
Start: 2025-03-22 | End: 2025-03-26 | Stop reason: HOSPADM

## 2025-03-22 RX ORDER — MAGNESIUM SULFATE 1 G/100ML
1000 INJECTION INTRAVENOUS ONCE
Status: COMPLETED | OUTPATIENT
Start: 2025-03-22 | End: 2025-03-22

## 2025-03-22 RX ORDER — AMLODIPINE BESYLATE 10 MG/1
10 TABLET ORAL DAILY
Status: DISCONTINUED | OUTPATIENT
Start: 2025-03-23 | End: 2025-03-26 | Stop reason: HOSPADM

## 2025-03-22 RX ADMIN — DOCUSATE SODIUM 100 MG: 100 CAPSULE, LIQUID FILLED ORAL at 13:12

## 2025-03-22 RX ADMIN — WATER 2000 MG: 1 INJECTION INTRAMUSCULAR; INTRAVENOUS; SUBCUTANEOUS at 09:25

## 2025-03-22 RX ADMIN — MAGNESIUM SULFATE HEPTAHYDRATE 1000 MG: 1 INJECTION, SOLUTION INTRAVENOUS at 09:39

## 2025-03-22 RX ADMIN — GABAPENTIN 300 MG: 300 CAPSULE ORAL at 20:49

## 2025-03-22 RX ADMIN — AMLODIPINE BESYLATE 5 MG: 5 TABLET ORAL at 13:12

## 2025-03-22 RX ADMIN — ACYCLOVIR 200 MG: 200 SUSPENSION ORAL at 20:49

## 2025-03-22 RX ADMIN — ACYCLOVIR 200 MG: 200 SUSPENSION ORAL at 09:26

## 2025-03-22 RX ADMIN — FONDAPARINUX SODIUM 2.5 MG: 2.5 INJECTION, SOLUTION SUBCUTANEOUS at 11:20

## 2025-03-22 RX ADMIN — POTASSIUM BICARBONATE 25 MEQ: 978 TABLET, EFFERVESCENT ORAL at 09:26

## 2025-03-22 RX ADMIN — POLYETHYLENE GLYCOL 3350 17 G: 17 POWDER, FOR SOLUTION ORAL at 13:12

## 2025-03-22 RX ADMIN — Medication 1 TABLET: at 09:26

## 2025-03-22 RX ADMIN — ACYCLOVIR 200 MG: 200 SUSPENSION ORAL at 14:23

## 2025-03-22 RX ADMIN — Medication 10 ML: at 20:53

## 2025-03-22 RX ADMIN — DOCUSATE SODIUM 100 MG: 100 CAPSULE, LIQUID FILLED ORAL at 20:49

## 2025-03-22 RX ADMIN — SODIUM CHLORIDE: 4.5 INJECTION, SOLUTION INTRAVENOUS at 10:51

## 2025-03-22 RX ADMIN — AMLODIPINE BESYLATE 5 MG: 5 TABLET ORAL at 09:26

## 2025-03-22 RX ADMIN — LATANOPROST 1 DROP: 50 SOLUTION OPHTHALMIC at 20:53

## 2025-03-22 RX ADMIN — Medication 6 MG: at 20:48

## 2025-03-22 RX ADMIN — POLYETHYLENE GLYCOL 3350 17 G: 17 POWDER, FOR SOLUTION ORAL at 20:49

## 2025-03-22 RX ADMIN — SODIUM CHLORIDE, PRESERVATIVE FREE 40 MG: 5 INJECTION INTRAVENOUS at 09:26

## 2025-03-22 RX ADMIN — Medication 10 ML: at 09:43

## 2025-03-22 ASSESSMENT — PAIN SCALES - GENERAL
PAINLEVEL_OUTOF10: 3
PAINLEVEL_OUTOF10: 0

## 2025-03-22 ASSESSMENT — PAIN DESCRIPTION - LOCATION: LOCATION: ABDOMEN

## 2025-03-22 ASSESSMENT — PAIN DESCRIPTION - ORIENTATION: ORIENTATION: MID;LEFT

## 2025-03-22 NOTE — CONSULTS
Assessment:  Urosepsis   E. coli UTI with bacteremia.  Ureterolithiasis s/p cysto retro with Left stent on March 17, 2025  Septic shock requiring pressor support (resolved)  Acute Kidney injury 2/2 stone and hypoperfusion   Lactic acidosis (resolved)  Elevated troponin most likely ischemic demand  Transaminitis  Leukocytosis with shift to the left  Thrombocytopenia  Asthma  Glaucoma  Trigeminal Neuralgia  Brain Tumor  Cholelithiasis without cholecystitis  Descending and sigmoid colonic diverticulosis     PLAN:  Continue with ceftriaxone for E. coli with bacteremia.  Arixtra for DVT prophylaxis  Protonix for GI prophylaxis  Change IV fluid to half-normal saline at 75 cc an hour  ECHO with normal EF  HIT antibodies  Arixtra for DVT prophylaxis pending HIT workup  Resume gabapentin  Increase amlodipine to 10 mg daily  DISPOSITION:  ICU  CODE STATUS:  FULL CODE    History of Present Illness: Patient is a 89 y.o. female with the following medical Problems: Asthma, Gabriel tumor (colloid cyst), glaucoma, Hypertension, Trigeminal neuralgia.  Patient presented to the ED 3/17 with acute onset of left flank pain which started last night around 1700, associated with chills, nausea, vomiting and diarrhea.  ED workup showed patient to be hypotensive, with a creatine of 2.1, lactic of 4.7, and White count of 32.8.  Further investigation showed CT of abdomen and pelvis noted having a 8 mm obstructing proximal to mid left ureteral calculus.  Patient received a fluid bolus, a dose of Maxipime and was scheduled for a cysto Retro pyelogram with left stent insertion by Dr Gomez.  Admitted to the ICU for closer monitoring and severe sepsis and Septic Shock.       CT Abdomen/CT Head-There is no evidence of acute intracranial abnormality.Obstructing 8 mm proximal to mid left ureteral calculus.   Descending and sigmoid colonic diverticulosis, without evidence of  diverticulitis. Small amount of ascites in the pericolic

## 2025-03-22 NOTE — PROCEDURES
PROCEDURE NOTE  Date: 3/22/2025   Name: Dianne Narayan  YOB: 1935    Procedures    SL midline Placement 3/22/2025    Product number: [id number]   Lot Number: [id number]      Ultrasound: yes   L Basilic::\"      Upper Arm Circumference: 26cm    Size: 4.5fr    Exposed Length: 0cm    Internal Length: 15cm   Cut: 0cm   Vein Measurement: 0.60cm      Brisk blood return   Flushed well  Dressing applied  Pt tolerated well  LAURIE Toussaint RN  3/22/2025  11:52 AM

## 2025-03-23 LAB
ANION GAP SERPL CALCULATED.3IONS-SCNC: 8 MMOL/L (ref 7–16)
BASOPHILS # BLD: 0 K/UL (ref 0–0.2)
BASOPHILS NFR BLD: 0 % (ref 0–2)
BUN SERPL-MCNC: 42 MG/DL (ref 6–23)
CALCIUM SERPL-MCNC: 8 MG/DL (ref 8.6–10.2)
CHLORIDE SERPL-SCNC: 105 MMOL/L (ref 98–107)
CO2 SERPL-SCNC: 26 MMOL/L (ref 22–29)
CREAT SERPL-MCNC: 1 MG/DL (ref 0.5–1)
EOSINOPHIL # BLD: 0.35 K/UL (ref 0.05–0.5)
EOSINOPHILS RELATIVE PERCENT: 3 % (ref 0–6)
ERYTHROCYTE [DISTWIDTH] IN BLOOD BY AUTOMATED COUNT: 15.5 % (ref 11.5–15)
GFR, ESTIMATED: 58 ML/MIN/1.73M2
GLUCOSE SERPL-MCNC: 87 MG/DL (ref 74–99)
HCT VFR BLD AUTO: 32.2 % (ref 34–48)
HGB BLD-MCNC: 10.9 G/DL (ref 11.5–15.5)
LYMPHOCYTES NFR BLD: 0.59 K/UL (ref 1.5–4)
LYMPHOCYTES RELATIVE PERCENT: 4 % (ref 20–42)
MAGNESIUM SERPL-MCNC: 1.7 MG/DL (ref 1.6–2.6)
MCH RBC QN AUTO: 31.7 PG (ref 26–35)
MCHC RBC AUTO-ENTMCNC: 33.9 G/DL (ref 32–34.5)
MCV RBC AUTO: 93.6 FL (ref 80–99.9)
MONOCYTES NFR BLD: 1.17 K/UL (ref 0.1–0.95)
MONOCYTES NFR BLD: 9 % (ref 2–12)
NEUTROPHILS NFR BLD: 84 % (ref 43–80)
NEUTS SEG NFR BLD: 11.39 K/UL (ref 1.8–7.3)
PHOSPHATE SERPL-MCNC: 3.3 MG/DL (ref 2.5–4.5)
PLATELET # BLD AUTO: 87 K/UL (ref 130–450)
PLATELET CONFIRMATION: NORMAL
PMV BLD AUTO: 11.4 FL (ref 7–12)
POTASSIUM SERPL-SCNC: 3.8 MMOL/L (ref 3.5–5)
RBC # BLD AUTO: 3.44 M/UL (ref 3.5–5.5)
RBC # BLD: NORMAL 10*6/UL
SODIUM SERPL-SCNC: 139 MMOL/L (ref 132–146)
WBC OTHER # BLD: 13.5 K/UL (ref 4.5–11.5)

## 2025-03-23 PROCEDURE — 36415 COLL VENOUS BLD VENIPUNCTURE: CPT

## 2025-03-23 PROCEDURE — 80048 BASIC METABOLIC PNL TOTAL CA: CPT

## 2025-03-23 PROCEDURE — 99233 SBSQ HOSP IP/OBS HIGH 50: CPT | Performed by: INTERNAL MEDICINE

## 2025-03-23 PROCEDURE — 6370000000 HC RX 637 (ALT 250 FOR IP): Performed by: INTERNAL MEDICINE

## 2025-03-23 PROCEDURE — 6360000002 HC RX W HCPCS: Performed by: INTERNAL MEDICINE

## 2025-03-23 PROCEDURE — 2500000003 HC RX 250 WO HCPCS: Performed by: SPECIALIST

## 2025-03-23 PROCEDURE — 2500000003 HC RX 250 WO HCPCS: Performed by: INTERNAL MEDICINE

## 2025-03-23 PROCEDURE — 6370000000 HC RX 637 (ALT 250 FOR IP): Performed by: SPECIALIST

## 2025-03-23 PROCEDURE — 2700000000 HC OXYGEN THERAPY PER DAY

## 2025-03-23 PROCEDURE — 84100 ASSAY OF PHOSPHORUS: CPT

## 2025-03-23 PROCEDURE — 2060000000 HC ICU INTERMEDIATE R&B

## 2025-03-23 PROCEDURE — 83735 ASSAY OF MAGNESIUM: CPT

## 2025-03-23 PROCEDURE — 85025 COMPLETE CBC W/AUTO DIFF WBC: CPT

## 2025-03-23 PROCEDURE — 6360000002 HC RX W HCPCS: Performed by: SPECIALIST

## 2025-03-23 PROCEDURE — 2580000003 HC RX 258: Performed by: INTERNAL MEDICINE

## 2025-03-23 RX ORDER — ACYCLOVIR 200 MG/5ML
200 SUSPENSION ORAL 3 TIMES DAILY
Qty: 75 ML | Refills: 0 | Status: SHIPPED | OUTPATIENT
Start: 2025-03-23 | End: 2025-03-28

## 2025-03-23 RX ADMIN — FONDAPARINUX SODIUM 2.5 MG: 2.5 INJECTION, SOLUTION SUBCUTANEOUS at 10:28

## 2025-03-23 RX ADMIN — WATER 2000 MG: 1 INJECTION INTRAMUSCULAR; INTRAVENOUS; SUBCUTANEOUS at 10:23

## 2025-03-23 RX ADMIN — Medication 1 TABLET: at 10:23

## 2025-03-23 RX ADMIN — POLYETHYLENE GLYCOL 3350 17 G: 17 POWDER, FOR SOLUTION ORAL at 10:24

## 2025-03-23 RX ADMIN — Medication 6 MG: at 20:38

## 2025-03-23 RX ADMIN — GABAPENTIN 300 MG: 300 CAPSULE ORAL at 20:38

## 2025-03-23 RX ADMIN — GABAPENTIN 300 MG: 300 CAPSULE ORAL at 10:23

## 2025-03-23 RX ADMIN — ACYCLOVIR 200 MG: 200 SUSPENSION ORAL at 13:05

## 2025-03-23 RX ADMIN — SODIUM CHLORIDE: 4.5 INJECTION, SOLUTION INTRAVENOUS at 14:48

## 2025-03-23 RX ADMIN — PANTOPRAZOLE SODIUM 40 MG: 40 TABLET, DELAYED RELEASE ORAL at 10:28

## 2025-03-23 RX ADMIN — LATANOPROST 1 DROP: 50 SOLUTION OPHTHALMIC at 20:38

## 2025-03-23 RX ADMIN — AMLODIPINE BESYLATE 10 MG: 10 TABLET ORAL at 10:23

## 2025-03-23 RX ADMIN — DOCUSATE SODIUM 100 MG: 100 CAPSULE, LIQUID FILLED ORAL at 20:38

## 2025-03-23 RX ADMIN — Medication 10 ML: at 20:38

## 2025-03-23 RX ADMIN — ACYCLOVIR 200 MG: 200 SUSPENSION ORAL at 20:38

## 2025-03-23 RX ADMIN — Medication 10 ML: at 10:29

## 2025-03-23 RX ADMIN — DOCUSATE SODIUM 100 MG: 100 CAPSULE, LIQUID FILLED ORAL at 10:23

## 2025-03-23 RX ADMIN — POLYETHYLENE GLYCOL 3350 17 G: 17 POWDER, FOR SOLUTION ORAL at 20:37

## 2025-03-24 LAB
ANION GAP SERPL CALCULATED.3IONS-SCNC: 10 MMOL/L (ref 7–16)
BUN SERPL-MCNC: 33 MG/DL (ref 6–23)
CALCIUM SERPL-MCNC: 8.1 MG/DL (ref 8.6–10.2)
CHLORIDE SERPL-SCNC: 106 MMOL/L (ref 98–107)
CO2 SERPL-SCNC: 25 MMOL/L (ref 22–29)
CREAT SERPL-MCNC: 0.9 MG/DL (ref 0.5–1)
GFR, ESTIMATED: 62 ML/MIN/1.73M2
GLUCOSE SERPL-MCNC: 84 MG/DL (ref 74–99)
MAGNESIUM SERPL-MCNC: 1.7 MG/DL (ref 1.6–2.6)
PHOSPHATE SERPL-MCNC: 3.7 MG/DL (ref 2.5–4.5)
POTASSIUM SERPL-SCNC: 3.9 MMOL/L (ref 3.5–5)
SODIUM SERPL-SCNC: 141 MMOL/L (ref 132–146)

## 2025-03-24 PROCEDURE — 84100 ASSAY OF PHOSPHORUS: CPT

## 2025-03-24 PROCEDURE — 36415 COLL VENOUS BLD VENIPUNCTURE: CPT

## 2025-03-24 PROCEDURE — 97110 THERAPEUTIC EXERCISES: CPT

## 2025-03-24 PROCEDURE — 6360000002 HC RX W HCPCS: Performed by: INTERNAL MEDICINE

## 2025-03-24 PROCEDURE — 2060000000 HC ICU INTERMEDIATE R&B

## 2025-03-24 PROCEDURE — 36592 COLLECT BLOOD FROM PICC: CPT

## 2025-03-24 PROCEDURE — 6370000000 HC RX 637 (ALT 250 FOR IP): Performed by: INTERNAL MEDICINE

## 2025-03-24 PROCEDURE — 6360000002 HC RX W HCPCS: Performed by: SPECIALIST

## 2025-03-24 PROCEDURE — 2700000000 HC OXYGEN THERAPY PER DAY

## 2025-03-24 PROCEDURE — 97530 THERAPEUTIC ACTIVITIES: CPT

## 2025-03-24 PROCEDURE — 97535 SELF CARE MNGMENT TRAINING: CPT

## 2025-03-24 PROCEDURE — 6370000000 HC RX 637 (ALT 250 FOR IP): Performed by: SPECIALIST

## 2025-03-24 PROCEDURE — 2580000003 HC RX 258: Performed by: INTERNAL MEDICINE

## 2025-03-24 PROCEDURE — 99232 SBSQ HOSP IP/OBS MODERATE 35: CPT | Performed by: INTERNAL MEDICINE

## 2025-03-24 PROCEDURE — 2500000003 HC RX 250 WO HCPCS: Performed by: INTERNAL MEDICINE

## 2025-03-24 PROCEDURE — 83735 ASSAY OF MAGNESIUM: CPT

## 2025-03-24 PROCEDURE — 2500000003 HC RX 250 WO HCPCS: Performed by: SPECIALIST

## 2025-03-24 PROCEDURE — 80048 BASIC METABOLIC PNL TOTAL CA: CPT

## 2025-03-24 RX ADMIN — AMLODIPINE BESYLATE 10 MG: 10 TABLET ORAL at 09:35

## 2025-03-24 RX ADMIN — POLYETHYLENE GLYCOL 3350 17 G: 17 POWDER, FOR SOLUTION ORAL at 20:32

## 2025-03-24 RX ADMIN — DOCUSATE SODIUM 100 MG: 100 CAPSULE, LIQUID FILLED ORAL at 09:36

## 2025-03-24 RX ADMIN — GABAPENTIN 300 MG: 300 CAPSULE ORAL at 09:34

## 2025-03-24 RX ADMIN — ACYCLOVIR 200 MG: 200 SUSPENSION ORAL at 20:32

## 2025-03-24 RX ADMIN — ACYCLOVIR 200 MG: 200 SUSPENSION ORAL at 14:00

## 2025-03-24 RX ADMIN — Medication 10 ML: at 13:56

## 2025-03-24 RX ADMIN — PANTOPRAZOLE SODIUM 40 MG: 40 TABLET, DELAYED RELEASE ORAL at 05:07

## 2025-03-24 RX ADMIN — Medication 10 ML: at 20:33

## 2025-03-24 RX ADMIN — Medication 1 TABLET: at 09:35

## 2025-03-24 RX ADMIN — GABAPENTIN 300 MG: 300 CAPSULE ORAL at 20:31

## 2025-03-24 RX ADMIN — LATANOPROST 1 DROP: 50 SOLUTION OPHTHALMIC at 20:32

## 2025-03-24 RX ADMIN — ACYCLOVIR 200 MG: 200 SUSPENSION ORAL at 09:40

## 2025-03-24 RX ADMIN — DOCUSATE SODIUM 100 MG: 100 CAPSULE, LIQUID FILLED ORAL at 20:31

## 2025-03-24 RX ADMIN — POLYETHYLENE GLYCOL 3350 17 G: 17 POWDER, FOR SOLUTION ORAL at 09:36

## 2025-03-24 RX ADMIN — FONDAPARINUX SODIUM 2.5 MG: 2.5 INJECTION, SOLUTION SUBCUTANEOUS at 13:55

## 2025-03-24 RX ADMIN — Medication 6 MG: at 20:31

## 2025-03-24 RX ADMIN — WATER 2000 MG: 1 INJECTION INTRAMUSCULAR; INTRAVENOUS; SUBCUTANEOUS at 09:37

## 2025-03-24 RX ADMIN — SODIUM CHLORIDE: 4.5 INJECTION, SOLUTION INTRAVENOUS at 05:05

## 2025-03-24 ASSESSMENT — PAIN SCALES - GENERAL: PAINLEVEL_OUTOF10: 0

## 2025-03-24 NOTE — CARE COORDINATION
SOCIAL WORK / DISCHARGE PLANNING:  Pt transferred from ICU. Placement is dc plan, chose [facility] but not accepting pt's insurance currently and [facility].( Following, has beds but wants to see PT/OT updates). Sw has requested therapy updates. PRECERT is needed prior to dc. ID notes med rec IV Rocephin, po Acyclovir, has midline. ASHLEY will need signed by physician. HENS form initiated, will need completed prior to dc.     Addendum: 228pm. Pt accepted to [facility], [facility] [phone], Fax [phone], skilled. PRECERT will be able to be obtained. HENS form initiated, will need completed prior to dc.               Electronically signed by ANA Reed on 3/24/2025 at 11:03 AM

## 2025-03-24 NOTE — DISCHARGE INSTR - COC
Continuity of Care Form    Patient Name: Dianne Narayan   :  1935  MRN:  [id number]    Admit date:  3/17/2025  Discharge date:  3/26/25    Code Status Order: Full Code   Advance Directives:     Admitting Physician:  Carol Manzanares MD  PCP: Velasquez Marshall MD    Discharging Nurse: Weston Tadeo RN  Discharging Hospital Unit/Room#: 0616/0616-02  Discharging Unit Phone Number: [phone]    Emergency Contact:   Extended Emergency Contact Information  Primary Emergency Contact: John Narayan  Mobile Phone: [phone]  Relation: Child  Secondary Emergency Contact: Iker Narayan  Address: [address] [place] [address]  Home Phone: [phone]  Relation: Spouse    Past Surgical History:  Past Surgical History:   Procedure Laterality Date    APPENDECTOMY      DEBRIDEMENT Left 3/17/2025    CYSTOSCOPY RETROGRADE PYELOGRAM LEFT STENT INSERTION performed by Daljit Gomez MD at [facility] OR    HYSTERECTOMY (CERVIX STATUS UNKNOWN)      JOINT REPLACEMENT      OTHER SURGICAL HISTORY      balloon compression for trigeminal neuralgia x 4    TONSILLECTOMY         Immunization History:   Immunization History   Administered Date(s) Administered    COVID-19, MODERNA, , (age 12y+), IM, 50mcg/0.5mL 2024    COVID-19, PFIZER Bivalent, DO NOT Dilute, (age 12y+), IM, 30 mcg/0.3 mL 2022    COVID-19, PFIZER PURPLE top, DILUTE for use, (age 12 y+), 30mcg/0.3mL 2021, 2021, 10/19/2021       Active Problems:  Patient Active Problem List   Diagnosis Code    Sepsis (HCC) A41.9    Diarrhea R19.7    Kidney stone N20.0    Ureterolithiasis N20.1    Acute kidney injury N17.9    Lactic acid acidosis E87.20    Elevated troponin level not due myocardial infarction R79.89    Transaminitis R74.01    Bandemia D72.825    Thrombocytopenia D69.6    Intermittent asthma with acute exacerbation J45.21    Glaucoma H40.9    Benign neoplasm of brain (HCC)

## 2025-03-25 LAB
ANION GAP SERPL CALCULATED.3IONS-SCNC: 7 MMOL/L (ref 7–16)
BUN SERPL-MCNC: 27 MG/DL (ref 6–23)
CALCIUM SERPL-MCNC: 8.3 MG/DL (ref 8.6–10.2)
CHLORIDE SERPL-SCNC: 105 MMOL/L (ref 98–107)
CO2 SERPL-SCNC: 28 MMOL/L (ref 22–29)
CREAT SERPL-MCNC: 1 MG/DL (ref 0.5–1)
ERYTHROCYTE [DISTWIDTH] IN BLOOD BY AUTOMATED COUNT: 15.3 % (ref 11.5–15)
GFR, ESTIMATED: 57 ML/MIN/1.73M2
GLUCOSE SERPL-MCNC: 106 MG/DL (ref 74–99)
HCT VFR BLD AUTO: 32.1 % (ref 34–48)
HGB BLD-MCNC: 10.7 G/DL (ref 11.5–15.5)
MCH RBC QN AUTO: 31.6 PG (ref 26–35)
MCHC RBC AUTO-ENTMCNC: 33.3 G/DL (ref 32–34.5)
MCV RBC AUTO: 94.7 FL (ref 80–99.9)
PF4 HEPARIN CMPLX IGG SERPL IA: 0.14 O.D. (ref 0–0.4)
PLATELET # BLD AUTO: 215 K/UL (ref 130–450)
PMV BLD AUTO: 10.1 FL (ref 7–12)
POTASSIUM SERPL-SCNC: 3.8 MMOL/L (ref 3.5–5)
RBC # BLD AUTO: 3.39 M/UL (ref 3.5–5.5)
SODIUM SERPL-SCNC: 140 MMOL/L (ref 132–146)
WBC OTHER # BLD: 16.5 K/UL (ref 4.5–11.5)

## 2025-03-25 PROCEDURE — 80048 BASIC METABOLIC PNL TOTAL CA: CPT

## 2025-03-25 PROCEDURE — 99239 HOSP IP/OBS DSCHRG MGMT >30: CPT | Performed by: INTERNAL MEDICINE

## 2025-03-25 PROCEDURE — 2500000003 HC RX 250 WO HCPCS: Performed by: INTERNAL MEDICINE

## 2025-03-25 PROCEDURE — 2500000003 HC RX 250 WO HCPCS: Performed by: SPECIALIST

## 2025-03-25 PROCEDURE — 85027 COMPLETE CBC AUTOMATED: CPT

## 2025-03-25 PROCEDURE — 6370000000 HC RX 637 (ALT 250 FOR IP): Performed by: INTERNAL MEDICINE

## 2025-03-25 PROCEDURE — 6360000002 HC RX W HCPCS: Performed by: SPECIALIST

## 2025-03-25 PROCEDURE — 6360000002 HC RX W HCPCS: Performed by: INTERNAL MEDICINE

## 2025-03-25 PROCEDURE — 99232 SBSQ HOSP IP/OBS MODERATE 35: CPT | Performed by: INTERNAL MEDICINE

## 2025-03-25 PROCEDURE — 6370000000 HC RX 637 (ALT 250 FOR IP): Performed by: UROLOGY

## 2025-03-25 PROCEDURE — 36592 COLLECT BLOOD FROM PICC: CPT

## 2025-03-25 PROCEDURE — 6370000000 HC RX 637 (ALT 250 FOR IP): Performed by: SPECIALIST

## 2025-03-25 PROCEDURE — 2060000000 HC ICU INTERMEDIATE R&B

## 2025-03-25 RX ORDER — AMLODIPINE BESYLATE 10 MG/1
10 TABLET ORAL DAILY
Qty: 30 TABLET | Refills: 0 | Status: SHIPPED | OUTPATIENT
Start: 2025-03-25

## 2025-03-25 RX ORDER — PANTOPRAZOLE SODIUM 40 MG/1
40 TABLET, DELAYED RELEASE ORAL
Qty: 30 TABLET | Refills: 0 | Status: SHIPPED | OUTPATIENT
Start: 2025-03-26

## 2025-03-25 RX ORDER — POLYETHYLENE GLYCOL 3350 17 G/17G
17 POWDER, FOR SOLUTION ORAL DAILY PRN
Qty: 527 G | Refills: 0 | Status: SHIPPED | OUTPATIENT
Start: 2025-03-25 | End: 2025-04-24

## 2025-03-25 RX ORDER — PSEUDOEPHEDRINE HCL 30 MG
100 TABLET ORAL 2 TIMES DAILY
Qty: 60 CAPSULE | Refills: 0 | Status: SHIPPED | OUTPATIENT
Start: 2025-03-25 | End: 2025-05-24

## 2025-03-25 RX ADMIN — WATER 2000 MG: 1 INJECTION INTRAMUSCULAR; INTRAVENOUS; SUBCUTANEOUS at 10:44

## 2025-03-25 RX ADMIN — Medication 6 MG: at 20:13

## 2025-03-25 RX ADMIN — FONDAPARINUX SODIUM 2.5 MG: 2.5 INJECTION, SOLUTION SUBCUTANEOUS at 10:41

## 2025-03-25 RX ADMIN — PANTOPRAZOLE SODIUM 40 MG: 40 TABLET, DELAYED RELEASE ORAL at 05:47

## 2025-03-25 RX ADMIN — LATANOPROST 1 DROP: 50 SOLUTION OPHTHALMIC at 20:12

## 2025-03-25 RX ADMIN — Medication 10 ML: at 20:13

## 2025-03-25 RX ADMIN — ACYCLOVIR 200 MG: 200 SUSPENSION ORAL at 14:44

## 2025-03-25 RX ADMIN — ACYCLOVIR 200 MG: 200 SUSPENSION ORAL at 10:43

## 2025-03-25 RX ADMIN — ACYCLOVIR 200 MG: 200 SUSPENSION ORAL at 20:12

## 2025-03-25 RX ADMIN — Medication 10 ML: at 10:46

## 2025-03-25 RX ADMIN — ACETAMINOPHEN 650 MG: 325 TABLET ORAL at 20:13

## 2025-03-25 RX ADMIN — GABAPENTIN 300 MG: 300 CAPSULE ORAL at 20:12

## 2025-03-25 RX ADMIN — Medication 1 TABLET: at 10:43

## 2025-03-25 RX ADMIN — GABAPENTIN 300 MG: 300 CAPSULE ORAL at 10:41

## 2025-03-25 RX ADMIN — AMLODIPINE BESYLATE 10 MG: 10 TABLET ORAL at 10:47

## 2025-03-25 NOTE — CARE COORDINATION
SOCIAL WORK / DISCHARGE PLANNING:  PRECERT to be obtained for [facility], N-[facility] [phone], Fax [phone], skilled. IV Rocephin med rec and midline in place. ASHLEY will need signed by physician. HENS form completed prior to dc. Transport will be arranged when dc order obtained.     Addendum: 410pm. PRECERT remains pending per LAURIE Kathleen care management manager.     Addendum: 449pm. PRECERT remains pending, Transport arranged via [facility] as  WILL CALL.     Electronically signed by ANA Reed on 3/25/2025 at 10:33 AM

## 2025-03-26 VITALS
SYSTOLIC BLOOD PRESSURE: 132 MMHG | RESPIRATION RATE: 18 BRPM | DIASTOLIC BLOOD PRESSURE: 102 MMHG | HEIGHT: 67 IN | OXYGEN SATURATION: 95 % | TEMPERATURE: 97.5 F | WEIGHT: 199.3 LBS | BODY MASS INDEX: 31.28 KG/M2 | HEART RATE: 78 BPM

## 2025-03-26 LAB
ANION GAP SERPL CALCULATED.3IONS-SCNC: 8 MMOL/L (ref 7–16)
BUN SERPL-MCNC: 21 MG/DL (ref 6–23)
CALCIUM SERPL-MCNC: 8.3 MG/DL (ref 8.6–10.2)
CHLORIDE SERPL-SCNC: 107 MMOL/L (ref 98–107)
CO2 SERPL-SCNC: 28 MMOL/L (ref 22–29)
CREAT SERPL-MCNC: 0.8 MG/DL (ref 0.5–1)
ERYTHROCYTE [DISTWIDTH] IN BLOOD BY AUTOMATED COUNT: 15.3 % (ref 11.5–15)
GFR, ESTIMATED: 70 ML/MIN/1.73M2
GLUCOSE SERPL-MCNC: 96 MG/DL (ref 74–99)
HCT VFR BLD AUTO: 31.4 % (ref 34–48)
HGB BLD-MCNC: 10.2 G/DL (ref 11.5–15.5)
MCH RBC QN AUTO: 31.3 PG (ref 26–35)
MCHC RBC AUTO-ENTMCNC: 32.5 G/DL (ref 32–34.5)
MCV RBC AUTO: 96.3 FL (ref 80–99.9)
MICROORGANISM SPEC CULT: NORMAL
MICROORGANISM SPEC CULT: NORMAL
PLATELET # BLD AUTO: 261 K/UL (ref 130–450)
PMV BLD AUTO: 9.6 FL (ref 7–12)
POTASSIUM SERPL-SCNC: 4 MMOL/L (ref 3.5–5)
RBC # BLD AUTO: 3.26 M/UL (ref 3.5–5.5)
SERVICE CMNT-IMP: NORMAL
SERVICE CMNT-IMP: NORMAL
SODIUM SERPL-SCNC: 143 MMOL/L (ref 132–146)
SPECIMEN DESCRIPTION: NORMAL
SPECIMEN DESCRIPTION: NORMAL
WBC OTHER # BLD: 11.3 K/UL (ref 4.5–11.5)

## 2025-03-26 PROCEDURE — 2700000000 HC OXYGEN THERAPY PER DAY

## 2025-03-26 PROCEDURE — 6370000000 HC RX 637 (ALT 250 FOR IP): Performed by: INTERNAL MEDICINE

## 2025-03-26 PROCEDURE — 99239 HOSP IP/OBS DSCHRG MGMT >30: CPT | Performed by: INTERNAL MEDICINE

## 2025-03-26 PROCEDURE — 6360000002 HC RX W HCPCS: Performed by: SPECIALIST

## 2025-03-26 PROCEDURE — 2500000003 HC RX 250 WO HCPCS: Performed by: SPECIALIST

## 2025-03-26 PROCEDURE — 80048 BASIC METABOLIC PNL TOTAL CA: CPT

## 2025-03-26 PROCEDURE — 6370000000 HC RX 637 (ALT 250 FOR IP): Performed by: SPECIALIST

## 2025-03-26 PROCEDURE — 85027 COMPLETE CBC AUTOMATED: CPT

## 2025-03-26 PROCEDURE — 99231 SBSQ HOSP IP/OBS SF/LOW 25: CPT | Performed by: INTERNAL MEDICINE

## 2025-03-26 PROCEDURE — 2500000003 HC RX 250 WO HCPCS: Performed by: INTERNAL MEDICINE

## 2025-03-26 PROCEDURE — 6360000002 HC RX W HCPCS: Performed by: INTERNAL MEDICINE

## 2025-03-26 RX ADMIN — Medication 1 TABLET: at 09:49

## 2025-03-26 RX ADMIN — AMLODIPINE BESYLATE 10 MG: 10 TABLET ORAL at 09:49

## 2025-03-26 RX ADMIN — GABAPENTIN 300 MG: 300 CAPSULE ORAL at 09:49

## 2025-03-26 RX ADMIN — ACYCLOVIR 200 MG: 200 SUSPENSION ORAL at 09:51

## 2025-03-26 RX ADMIN — WATER 2000 MG: 1 INJECTION INTRAMUSCULAR; INTRAVENOUS; SUBCUTANEOUS at 09:53

## 2025-03-26 RX ADMIN — PANTOPRAZOLE SODIUM 40 MG: 40 TABLET, DELAYED RELEASE ORAL at 05:17

## 2025-03-26 RX ADMIN — FONDAPARINUX SODIUM 2.5 MG: 2.5 INJECTION, SOLUTION SUBCUTANEOUS at 09:57

## 2025-03-26 RX ADMIN — Medication 10 ML: at 09:53

## 2025-03-26 NOTE — DISCHARGE SUMMARY
[facility] Hospitalist       Hospitalist Physician Discharge Summary       [facility]  [address] [address]  [phone]  Follow up        Activity level: Slowly increase as tolerated    Diet: ADULT DIET; Easy to Chew  ADULT ORAL NUTRITION SUPPLEMENT; Breakfast, Dinner; Standard High Calorie/High Protein Oral Supplement    Labs: None are pending at the discharge    Condition at discharge: Stable    Dispo: Facility    Patient ID:  Dianne Narayan  [id number]  89 y.o.  1935    Admit date: 3/17/2025    Discharge date and time:  3/25/2025  10:35 AM    Admission Diagnoses: Principal Problem:    Sepsis (HCC)  Active Problems:    Diarrhea    Kidney stone    Ureterolithiasis    Acute kidney injury    Lactic acid acidosis    Elevated troponin level not due myocardial infarction    Transaminitis    Bandemia    Thrombocytopenia    Intermittent asthma with acute exacerbation    Glaucoma    Benign neoplasm of brain (HCC)    Trigeminal neuralgia    Acute renal insufficiency  Resolved Problems:    * No resolved hospital problems. *      Discharge Diagnoses: Principal Problem:    Sepsis (HCC)  Active Problems:    Diarrhea    Kidney stone    Ureterolithiasis    Acute kidney injury    Lactic acid acidosis    Elevated troponin level not due myocardial infarction    Transaminitis    Bandemia    Thrombocytopenia    Intermittent asthma with acute exacerbation    Glaucoma    Benign neoplasm of brain (HCC)    Trigeminal neuralgia    Acute renal insufficiency  Resolved Problems:    * No resolved hospital problems. *      Consults:  IP CONSULT TO UROLOGY  IP CONSULT TO INFECTIOUS DISEASES  IP CONSULT TO CRITICAL CARE  IP CONSULT TO VASCULAR ACCESS TEAM    Procedures: None significant except if described in hospital course.    Hospital Course: History of present illness from History and Physical:  : A 89-year-old female with past medical history of hypertension, trigeminal neuralgia and peripheral 
female with past medical history of hypertension, trigeminal neuralgia and peripheral neuropathy who presents from home with acute onset of left flank pain which started yesterday around 5 PM associated with chills and multiple episodes of nausea, vomiting and diarrhea.  Patient denies any chest pain or shortness of breath.     ED course:  Patient was hypotensive 95/42.  Labs noted with creatinine 2.1, lactic acid 4.7 and WBC 32.8.  Blood and urine culture collected and patient received 1 dose of cefepime.  CT abdomen pelvis noted with obstructing 8 mm proximal to mid left ureteral calculus.  Patient received IV fluid boluses and the plan for cystoscopy with left ureteral stent placement then the patient will admitted to the ICU.    Septic shock-secondary to E. coli bacteremia.  Resolved.  Had IV fluids.  Continue zovirax.  repeat blood cultures 3/21 (-)  E. coli UTI infectious disease had meds reconciled her discharge antibiotic plan: IV Rocephin  Hsv: id covering with with acyclovir.  I reviewed picture in chart. I have not seen leakage of fluid from this lession.    ADORE secondary to sepsis, dehydration, and post renal azotemia from 8mm left stone.  While here he had fluids and antibiotics.  Renal function returned to normal  Left ureteral stone- s/p cystoscopy, retrograde pyelogram, left stent insertion   Essential hypertension-continue amlodipine  Shingles of face-Continue acyclovir for 7 days per ID.  Thrombocytopenia on Arixtra platelets improved on 3/24.   Constipation bowel regimen continues   DVT prophylaxis: Arixtra while here since 3/22 which will have to be reevaluated at her facility.  On 3/21 intensivist Dr. Oro noted that she had thrombocytopenia due to sepsis.  On 3/22 intensivist Dr. Paul started Arixtra  Full code.  Disposition: To facility today.  Precert arrived after 7pm on 3/25    Discharge Exam:  Vitals:    03/25/25 0735 03/25/25 1047 03/25/25 2000 03/26/25 0512   BP: (!) 139/58 (!)

## 2025-03-26 NOTE — PLAN OF CARE
Problem: Discharge Planning  Goal: Discharge to home or other facility with appropriate resources  3/21/2025 0101 by Rosalia Bonilla RN  Outcome: Progressing  Flowsheets (Taken 3/21/2025 0000)  Discharge to home or other facility with appropriate resources: Identify barriers to discharge with patient and caregiver  3/20/2025 1358 by Bridget Springer RN  Outcome: Progressing  Flowsheets (Taken 3/20/2025 0800)  Discharge to home or other facility with appropriate resources: Identify barriers to discharge with patient and caregiver     Problem: Pain  Goal: Verbalizes/displays adequate comfort level or baseline comfort level  3/21/2025 0101 by Rosalia Bonilla RN  Outcome: Progressing  Flowsheets (Taken 3/21/2025 0000)  Verbalizes/displays adequate comfort level or baseline comfort level: Encourage patient to monitor pain and request assistance  3/20/2025 1358 by Bridget Springer RN  Outcome: Progressing  Flowsheets (Taken 3/20/2025 0800)  Verbalizes/displays adequate comfort level or baseline comfort level: Encourage patient to monitor pain and request assistance     Problem: Skin/Tissue Integrity  Goal: Skin integrity remains intact  Description: 1.  Monitor for areas of redness and/or skin breakdown  2.  Assess vascular access sites hourly  3.  Every 4-6 hours minimum:  Change oxygen saturation probe site  4.  Every 4-6 hours:  If on nasal continuous positive airway pressure, respiratory therapy assess nares and determine need for appliance change or resting period  3/21/2025 0101 by Rosalia Bonilla RN  Outcome: Progressing  Flowsheets (Taken 3/21/2025 0000)  Skin Integrity Remains Intact: Monitor for areas of redness and/or skin breakdown  3/20/2025 1358 by Bridget Springer RN  Outcome: Progressing  Flowsheets  Taken 3/20/2025 1357  Skin Integrity Remains Intact: Monitor for areas of redness and/or skin breakdown  Taken 3/20/2025 0800  Skin Integrity Remains Intact: Monitor for areas of redness and/or skin breakdown   
  Problem: Discharge Planning  Goal: Discharge to home or other facility with appropriate resources  3/25/2025 2201 by Peri Grimm RN  Outcome: Progressing  3/25/2025 1401 by Mounkia Medel RN  Outcome: Progressing  Flowsheets (Taken 3/22/2025 2322 by Anna Beltran, LAURIE)  Discharge to home or other facility with appropriate resources: Identify barriers to discharge with patient and caregiver  3/25/2025 1358 by Mounika Medel RN  Outcome: Progressing  Flowsheets (Taken 3/22/2025 2322 by Anna Beltran RN)  Discharge to home or other facility with appropriate resources: Identify barriers to discharge with patient and caregiver     Problem: Pain  Goal: Verbalizes/displays adequate comfort level or baseline comfort level  3/25/2025 2201 by Peri Grimm RN  Outcome: Progressing  3/25/2025 1401 by Mounika Medel RN  Outcome: Progressing  3/25/2025 1358 by Mounika Medel RN  Outcome: Progressing  Flowsheets (Taken 3/24/2025 0829)  Verbalizes/displays adequate comfort level or baseline comfort level: Assess pain using appropriate pain scale     Problem: Skin/Tissue Integrity  Goal: Skin integrity remains intact  3/25/2025 2201 by Peri Grimm RN  Outcome: Progressing  3/25/2025 1401 by Mounika Medel RN  Outcome: Progressing  3/25/2025 1358 by Mounika Medel RN  Outcome: Progressing  Flowsheets (Taken 3/21/2025 0800 by Bridget Springer RN)  Skin Integrity Remains Intact: Monitor for areas of redness and/or skin breakdown     Problem: ABCDS Injury Assessment  Goal: Absence of physical injury  3/25/2025 2201 by Peri Grimm RN  Outcome: Progressing  3/25/2025 1401 by Mounika Medel RN  Outcome: Progressing  3/25/2025 1358 by Mounika Medel RN  Outcome: Progressing  Flowsheets (Taken 3/17/2025 1945 by Enoc Hernández RN)  Absence of Physical Injury: Implement safety measures based on patient assessment     Problem: Safety - Adult  Goal: Free from fall injury  3/25/2025 2201 by Peri Grimm 
  Problem: Discharge Planning  Goal: Discharge to home or other facility with appropriate resources  Outcome: Progressing     Problem: Pain  Goal: Verbalizes/displays adequate comfort level or baseline comfort level  Outcome: Progressing     Problem: Skin/Tissue Integrity  Goal: Skin integrity remains intact  Description: 1.  Monitor for areas of redness and/or skin breakdown  2.  Assess vascular access sites hourly  3.  Every 4-6 hours minimum:  Change oxygen saturation probe site  4.  Every 4-6 hours:  If on nasal continuous positive airway pressure, respiratory therapy assess nares and determine need for appliance change or resting period  Outcome: Progressing     Problem: ABCDS Injury Assessment  Goal: Absence of physical injury  Outcome: Progressing     Problem: Safety - Adult  Goal: Free from fall injury  Outcome: Progressing     Problem: Neurosensory - Adult  Goal: Achieves stable or improved neurological status  Outcome: Progressing  Goal: Achieves maximal functionality and self care  Outcome: Progressing     Problem: Respiratory - Adult  Goal: Achieves optimal ventilation and oxygenation  Outcome: Progressing     Problem: Musculoskeletal - Adult  Goal: Return mobility to safest level of function  Outcome: Progressing  Goal: Maintain proper alignment of affected body part  Outcome: Progressing  Goal: Return ADL status to a safe level of function  Outcome: Progressing     Problem: Gastrointestinal - Adult  Goal: Maintains or returns to baseline bowel function  Outcome: Progressing  Goal: Maintains adequate nutritional intake  Outcome: Progressing     Problem: Genitourinary - Adult  Goal: Absence of urinary retention  Outcome: Progressing  Goal: Urinary catheter remains patent  Outcome: Progressing     Problem: Metabolic/Fluid and Electrolytes - Adult  Goal: Electrolytes maintained within normal limits  Outcome: Progressing  Goal: Hemodynamic stability and optimal renal function maintained  Outcome: 
  Problem: Discharge Planning  Goal: Discharge to home or other facility with appropriate resources  Outcome: Progressing     Problem: Pain  Goal: Verbalizes/displays adequate comfort level or baseline comfort level  Outcome: Progressing     Problem: Skin/Tissue Integrity  Goal: Skin integrity remains intact  Description: 1.  Monitor for areas of redness and/or skin breakdown  2.  Assess vascular access sites hourly  3.  Every 4-6 hours minimum:  Change oxygen saturation probe site  4.  Every 4-6 hours:  If on nasal continuous positive airway pressure, respiratory therapy assess nares and determine need for appliance change or resting period  Outcome: Progressing  Flowsheets (Taken 3/17/2025 1945)  Skin Integrity Remains Intact:   Monitor for areas of redness and/or skin breakdown   Assess vascular access sites hourly   Every 4-6 hours minimum: Change oxygen saturation probe site     Problem: ABCDS Injury Assessment  Goal: Absence of physical injury  Outcome: Progressing  Flowsheets (Taken 3/17/2025 1945)  Absence of Physical Injury: Implement safety measures based on patient assessment     Problem: Safety - Adult  Goal: Free from fall injury  Outcome: Progressing  Flowsheets (Taken 3/17/2025 1945)  Free From Fall Injury:   Based on caregiver fall risk screen, instruct family/caregiver to ask for assistance with transferring infant if caregiver noted to have fall risk factors   Instruct family/caregiver on patient safety     
  Problem: Neurosensory - Adult  Goal: Achieves stable or improved neurological status  3/21/2025 0947 by Bridget Springer RN  Outcome: Progressing  Flowsheets (Taken 3/21/2025 0800)  Achieves stable or improved neurological status: Assess for and report changes in neurological status  3/21/2025 0101 by Rosalia Bonilla RN  Outcome: Not Progressing  Flowsheets (Taken 3/21/2025 0000)  Achieves stable or improved neurological status: Assess for and report changes in neurological status  Goal: Achieves maximal functionality and self care  3/21/2025 0947 by Bridget Springer RN  Outcome: Progressing  Flowsheets (Taken 3/21/2025 0800)  Achieves maximal functionality and self care: Monitor swallowing and airway patency with patient fatigue and changes in neurological status  3/21/2025 0101 by Rosalia Bonilla RN  Outcome: Not Progressing  Flowsheets (Taken 3/21/2025 0000)  Achieves maximal functionality and self care: Monitor swallowing and airway patency with patient fatigue and changes in neurological status     Problem: Musculoskeletal - Adult  Goal: Return mobility to safest level of function  3/21/2025 0947 by Bridget Springer RN  Outcome: Progressing  Flowsheets (Taken 3/21/2025 0800)  Return Mobility to Safest Level of Function: Assess patient stability and activity tolerance for standing, transferring and ambulating with or without assistive devices  3/21/2025 0101 by Rosalia Bonilla RN  Outcome: Not Progressing  Flowsheets (Taken 3/21/2025 0000)  Return Mobility to Safest Level of Function: Assess patient stability and activity tolerance for standing, transferring and ambulating with or without assistive devices  Goal: Return ADL status to a safe level of function  3/21/2025 0947 by Bridget Springer RN  Outcome: Progressing  Flowsheets (Taken 3/21/2025 0800)  Return ADL Status to a Safe Level of Function: Administer medication as ordered  3/21/2025 0101 by Rosalia Bonilla RN  Outcome: Not Progressing  Flowsheets (Taken 
  Problem: Neurosensory - Adult  Goal: Achieves stable or improved neurological status  3/22/2025 1126 by Bridget Springer RN  Outcome: Progressing     Problem: Respiratory - Adult  Goal: Achieves optimal ventilation and oxygenation  3/22/2025 1126 by Bridget Springer RN  Outcome: Progressing     Problem: Musculoskeletal - Adult  Goal: Return mobility to safest level of function  3/22/2025 1126 by Bridget Springer RN  Outcome: Progressing     Problem: Musculoskeletal - Adult  Goal: Return ADL status to a safe level of function  3/22/2025 0100 by Rosalia Bonilla RN  Outcome: Progressing     Problem: Gastrointestinal - Adult  Goal: Maintains or returns to baseline bowel function  3/22/2025 0100 by Rosalia Bonilla RN  Outcome: Progressing     Problem: Genitourinary - Adult  Goal: Urinary catheter remains patent  3/22/2025 1126 by Bridget Springer RN  Outcome: Progressing     Problem: Genitourinary - Adult  Goal: Absence of urinary retention  3/22/2025 1126 by Bridget Springer RN  Outcome: Progressing     Problem: Confusion  Goal: Confusion, delirium, dementia, or psychosis is improved or at baseline  Description: INTERVENTIONS:  1. Assess for possible contributors to thought disturbance, including medications, impaired vision or hearing, underlying metabolic abnormalities, dehydration, psychiatric diagnoses, and notify attending LIP  2. [place] high risk fall precautions, as indicated  3. Provide frequent short contacts to provide reality reorientation, refocusing and direction  4. Decrease environmental stimuli, including noise as appropriate  5. Monitor and intervene to maintain adequate nutrition, hydration, elimination, sleep and activity  6. If unable to ensure safety without constant attention obtain sitter and review sitter guidelines with assigned personnel  7. Initiate Psychosocial CNS and Spiritual Care consult, as indicated  3/22/2025 1126 by Bridget Springer RN  Outcome: Progressing     Problem: Nutrition Deficit:  Goal: 
  Problem: Neurosensory - Adult  Goal: Achieves stable or improved neurological status  Outcome: Not Progressing  Flowsheets (Taken 3/20/2025 0800)  Achieves stable or improved neurological status: Assess for and report changes in neurological status  Goal: Achieves maximal functionality and self care  Outcome: Not Progressing  Flowsheets (Taken 3/20/2025 0800)  Achieves maximal functionality and self care: Monitor swallowing and airway patency with patient fatigue and changes in neurological status     Problem: Musculoskeletal - Adult  Goal: Return mobility to safest level of function  Outcome: Not Progressing  Flowsheets (Taken 3/20/2025 0800)  Return Mobility to Safest Level of Function: Assess patient stability and activity tolerance for standing, transferring and ambulating with or without assistive devices  Goal: Return ADL status to a safe level of function  Outcome: Not Progressing  Flowsheets (Taken 3/20/2025 0800)  Return ADL Status to a Safe Level of Function: Administer medication as ordered     Problem: Gastrointestinal - Adult  Goal: Maintains or returns to baseline bowel function  Outcome: Not Progressing  Goal: Maintains adequate nutritional intake  Outcome: Not Progressing     Problem: Confusion  Goal: Confusion, delirium, dementia, or psychosis is improved or at baseline  Description: INTERVENTIONS:  1. Assess for possible contributors to thought disturbance, including medications, impaired vision or hearing, underlying metabolic abnormalities, dehydration, psychiatric diagnoses, and notify attending LIP  2. [place] high risk fall precautions, as indicated  3. Provide frequent short contacts to provide reality reorientation, refocusing and direction  4. Decrease environmental stimuli, including noise as appropriate  5. Monitor and intervene to maintain adequate nutrition, hydration, elimination, sleep and activity  6. If unable to ensure safety without constant attention obtain sitter and review 
  Problem: Pain  Goal: Verbalizes/displays adequate comfort level or baseline comfort level  3/19/2025 0010 by Deneen Narayan RN  Outcome: Progressing  Flowsheets (Taken 3/18/2025 1600 by Ritesh Ivey RN)  Verbalizes/displays adequate comfort level or baseline comfort level: Assess pain using appropriate pain scale     Problem: Skin/Tissue Integrity  Goal: Skin integrity remains intact  Description: 1.  Monitor for areas of redness and/or skin breakdown  2.  Assess vascular access sites hourly  3.  Every 4-6 hours minimum:  Change oxygen saturation probe site  4.  Every 4-6 hours:  If on nasal continuous positive airway pressure, respiratory therapy assess nares and determine need for appliance change or resting period  3/19/2025 0010 by Deneen Narayan RN  Outcome: Progressing  Flowsheets (Taken 3/18/2025 1600 by Ritesh Ivey RN)  Skin Integrity Remains Intact:   Monitor for areas of redness and/or skin breakdown   Assess vascular access sites hourly   Every 4-6 hours minimum: Change oxygen saturation probe site     Problem: ABCDS Injury Assessment  Goal: Absence of physical injury  3/19/2025 0010 by Deneen Narayan RN  Outcome: Progressing     Problem: Safety - Adult  Goal: Free from fall injury  3/19/2025 0010 by Deneen Narayan RN  Outcome: Progressing     Problem: Neurosensory - Adult  Goal: Achieves stable or improved neurological status  Outcome: Progressing  Flowsheets (Taken 3/18/2025 1600 by Ritesh Ivey RN)  Achieves stable or improved neurological status:   Assess for and report changes in neurological status   Initiate measures to prevent increased intracranial pressure   Maintain blood pressure and fluid volume within ordered parameters to optimize cerebral perfusion and minimize risk of hemorrhage     
  Problem: Pain  Goal: Verbalizes/displays adequate comfort level or baseline comfort level  3/20/2025 0008 by Deneen Narayan RN  Outcome: Progressing  Flowsheets (Taken 3/19/2025 1600 by Ritesh Ivey, RN)  Verbalizes/displays adequate comfort level or baseline comfort level: Encourage patient to monitor pain and request assistance     Problem: Skin/Tissue Integrity  Goal: Skin integrity remains intact  Description: 1.  Monitor for areas of redness and/or skin breakdown  2.  Assess vascular access sites hourly  3.  Every 4-6 hours minimum:  Change oxygen saturation probe site  4.  Every 4-6 hours:  If on nasal continuous positive airway pressure, respiratory therapy assess nares and determine need for appliance change or resting period  3/20/2025 0008 by Deneen Narayan RN  Outcome: Progressing  Flowsheets (Taken 3/19/2025 1600 by Ritesh Ivey RN)  Skin Integrity Remains Intact:   Monitor for areas of redness and/or skin breakdown   Assess vascular access sites hourly   Every 4-6 hours minimum: Change oxygen saturation probe site     Problem: ABCDS Injury Assessment  Goal: Absence of physical injury  3/20/2025 0008 by Deneen Narayan, RN  Outcome: Progressing     Problem: Safety - Adult  Goal: Free from fall injury  3/20/2025 0008 by Deneen Narayan, RN  Outcome: Progressing     
  Problem: Pain  Goal: Verbalizes/displays adequate comfort level or baseline comfort level  Outcome: Progressing     Problem: Skin/Tissue Integrity  Goal: Skin integrity remains intact  Description: 1.  Monitor for areas of redness and/or skin breakdown  2.  Assess vascular access sites hourly  3.  Every 4-6 hours minimum:  Change oxygen saturation probe site  4.  Every 4-6 hours:  If on nasal continuous positive airway pressure, respiratory therapy assess nares and determine need for appliance change or resting period  Outcome: Progressing     Problem: ABCDS Injury Assessment  Goal: Absence of physical injury  Outcome: Progressing     Problem: Safety - Adult  Goal: Free from fall injury  Outcome: Progressing     Problem: Neurosensory - Adult  Goal: Achieves stable or improved neurological status  Outcome: Progressing     Problem: Neurosensory - Adult  Goal: Achieves maximal functionality and self care  Outcome: Progressing     Problem: Respiratory - Adult  Goal: Achieves optimal ventilation and oxygenation  Outcome: Progressing     Problem: Musculoskeletal - Adult  Goal: Return mobility to safest level of function  Outcome: Progressing     Problem: Musculoskeletal - Adult  Goal: Maintain proper alignment of affected body part  Outcome: Progressing     Problem: Musculoskeletal - Adult  Goal: Return ADL status to a safe level of function  Outcome: Progressing     Problem: Gastrointestinal - Adult  Goal: Maintains or returns to baseline bowel function  Outcome: Progressing     Problem: Gastrointestinal - Adult  Goal: Maintains adequate nutritional intake  Outcome: Progressing     Problem: Genitourinary - Adult  Goal: Absence of urinary retention  Outcome: Progressing     Problem: Genitourinary - Adult  Goal: Urinary catheter remains patent  Outcome: Progressing     Problem: Metabolic/Fluid and Electrolytes - Adult  Goal: Electrolytes maintained within normal limits  Outcome: Progressing     Problem: Metabolic/Fluid 
  Problem: Pain  Goal: Verbalizes/displays adequate comfort level or baseline comfort level  Outcome: Progressing     Problem: Skin/Tissue Integrity  Goal: Skin integrity remains intact  Description: 1.  Monitor for areas of redness and/or skin breakdown  2.  Assess vascular access sites hourly  3.  Every 4-6 hours minimum:  Change oxygen saturation probe site  4.  Every 4-6 hours:  If on nasal continuous positive airway pressure, respiratory therapy assess nares and determine need for appliance change or resting period  Outcome: Progressing     Problem: ABCDS Injury Assessment  Goal: Absence of physical injury  Outcome: Progressing     Problem: Safety - Adult  Goal: Free from fall injury  Outcome: Progressing     Problem: Respiratory - Adult  Goal: Achieves optimal ventilation and oxygenation  Outcome: Progressing     Problem: Musculoskeletal - Adult  Goal: Return mobility to safest level of function  Outcome: Progressing     Problem: Musculoskeletal - Adult  Goal: Maintain proper alignment of affected body part  Outcome: Progressing     Problem: Musculoskeletal - Adult  Goal: Return ADL status to a safe level of function  Outcome: Progressing     Problem: Gastrointestinal - Adult  Goal: Minimal or absence of nausea and vomiting  Outcome: Progressing     Problem: Gastrointestinal - Adult  Goal: Maintains or returns to baseline bowel function  Outcome: Progressing     Problem: Genitourinary - Adult  Goal: Absence of urinary retention  Outcome: Progressing     Problem: Genitourinary - Adult  Goal: Urinary catheter remains patent  Outcome: Progressing     Problem: Metabolic/Fluid and Electrolytes - Adult  Goal: Electrolytes maintained within normal limits  Outcome: Progressing     Problem: Metabolic/Fluid and Electrolytes - Adult  Goal: Hemodynamic stability and optimal renal function maintained  Outcome: Progressing     Problem: Anxiety  Goal: Will report anxiety at manageable levels  Description: INTERVENTIONS:  1. 
  Problem: Skin/Tissue Integrity  Goal: Skin integrity remains intact  Description: 1.  Monitor for areas of redness and/or skin breakdown  2.  Assess vascular access sites hourly  3.  Every 4-6 hours minimum:  Change oxygen saturation probe site  4.  Every 4-6 hours:  If on nasal continuous positive airway pressure, respiratory therapy assess nares and determine need for appliance change or resting period  3/25/2025 0402 by Drew Levine, RN  Outcome: Progressing  3/24/2025 1508 by Mounika Medel RN  Outcome: Progressing  Flowsheets (Taken 3/21/2025 0800 by Bridget Springer RN)  Skin Integrity Remains Intact: Monitor for areas of redness and/or skin breakdown     Problem: Safety - Adult  Goal: Free from fall injury  3/25/2025 0402 by Drew Levine RN  Outcome: Progressing  3/24/2025 1508 by Mounika Medel RN  Outcome: Progressing  Flowsheets (Taken 3/17/2025 1945 by Enoc Hernández, RN)  Free From Fall Injury:   Based on caregiver fall risk screen, instruct family/caregiver to ask for assistance with transferring infant if caregiver noted to have fall risk factors   Instruct family/caregiver on patient safety     
status: Assess for and report changes in neurological status     Problem: Neurosensory - Adult  Goal: Achieves maximal functionality and self care  Outcome: Progressing  Flowsheets (Taken 3/22/2025 1200 by Bridget Springer RN)  Achieves maximal functionality and self care: Monitor swallowing and airway patency with patient fatigue and changes in neurological status     Problem: Respiratory - Adult  Goal: Achieves optimal ventilation and oxygenation  Outcome: Progressing     Problem: Musculoskeletal - Adult  Goal: Return mobility to safest level of function  Outcome: Progressing     Problem: Musculoskeletal - Adult  Goal: Maintain proper alignment of affected body part  Outcome: Progressing     Problem: Musculoskeletal - Adult  Goal: Return ADL status to a safe level of function  Outcome: Progressing     Problem: Gastrointestinal - Adult  Goal: Maintains or returns to baseline bowel function  Outcome: Progressing     Problem: Gastrointestinal - Adult  Goal: Maintains adequate nutritional intake  Outcome: Progressing     Problem: Genitourinary - Adult  Goal: Absence of urinary retention  Outcome: Progressing     Problem: Genitourinary - Adult  Goal: Urinary catheter remains patent  Outcome: Progressing     Problem: Metabolic/Fluid and Electrolytes - Adult  Goal: Electrolytes maintained within normal limits  Outcome: Progressing     Problem: Metabolic/Fluid and Electrolytes - Adult  Goal: Hemodynamic stability and optimal renal function maintained  Outcome: Progressing     Problem: Anxiety  Goal: Will report anxiety at manageable levels  Description: INTERVENTIONS:  1. Administer medication as ordered  2. Teach and rehearse alternative coping skills  3. Provide emotional support with 1:1 interaction with staff  Outcome: Progressing     Problem: Coping  Goal: Pt/Family able to verbalize concerns and demonstrate effective coping strategies  Description: INTERVENTIONS:  1. Assist patient/family to identify coping skills, 
demonstrate effective coping: Assist patient/family to identify coping skills, available support systems and cultural and spiritual values     Problem: Decision Making  Goal: Pt/Family able to effectively weigh alternatives and participate in decision making related to treatment and care  Description: INTERVENTIONS:  1. Determine when there are differences between patient's view, family's view, and healthcare provider's view of condition  2. Facilitate patient and family articulation of goals for care  3. Help patient and family identify pros/cons of alternative solutions  4. Provide information as requested by patient/family  5. Respect patient/family right to receive or not to receive information  6. Serve as a liaison between patient and family and health care team  7. Initiate Consults from Ethics, Palliative Care or initiate Family Care Conference as is appropriate  Outcome: Progressing  Flowsheets (Taken 3/22/2025 0800 by Bridget Springer RN)  Patient/family able to effectively weigh alternatives and participate in decision making related to treatment and care: Determine when there are differences between patient's view, family's view, and healthcare provider's view of condition     Problem: Confusion  Goal: Confusion, delirium, dementia, or psychosis is improved or at baseline  Description: INTERVENTIONS:  1. Assess for possible contributors to thought disturbance, including medications, impaired vision or hearing, underlying metabolic abnormalities, dehydration, psychiatric diagnoses, and notify attending LIP  2. [place] high risk fall precautions, as indicated  3. Provide frequent short contacts to provide reality reorientation, refocusing and direction  4. Decrease environmental stimuli, including noise as appropriate  5. Monitor and intervene to maintain adequate nutrition, hydration, elimination, sleep and activity  6. If unable to ensure safety without constant attention obtain sitter and review sitter

## 2025-03-26 NOTE — CARE COORDINATION
SOCIAL WORK / DISCHARGE PLANNING:  PRECERT obtained for [facility], [place], [place] [phone], Fax [phone], skilled. Transport arranged via [facility] 1100am. Weston SULLIVAN aware. Sw notified pt at bedside and spouse via phone of dc and time of transport. HENs form completed.           .      Electronically signed by ANA Reed on 3/26/2025 at 9:38 AM

## 2025-03-26 NOTE — PROGRESS NOTES
[facility] Hospitalist Progress Note    Admitting Date and Time: 3/17/2025 12:33 PM  Admit Dx: Ureterolithiasis [N20.1]  Acute renal insufficiency [N28.9]  Septic shock (HCC) [A41.9, R65.21]  Diarrhea, unspecified type [R19.7]  Sepsis, due to unspecified organism, unspecified whether acute organ dysfunction present (HCC) [A41.9]    Subjective:  Patient is being followed for Ureterolithiasis [N20.1]  Acute renal insufficiency [N28.9]  Septic shock (HCC) [A41.9, R65.21]  Diarrhea, unspecified type [R19.7]  Sepsis, due to unspecified organism, unspecified whether acute organ dysfunction present (HCC) [A41.9]     Patient is still critically ill and required a critical level of care.  No acute events overnight.  Patient looks alert and oriented today and BP is stable off vasopressors today.  Patient  needs 2 L of nasal cannula overnight for possible HARLEY.    OS: denies fever, chills, cp, sob, n/v, HA unless stated above.      cefTRIAXone (ROCEPHIN) IV  2,000 mg IntraVENous Q24H    hydrocortisone sodium succinate PF  50 mg IntraVENous Q8H    midodrine  10 mg Oral TID WC    sodium chloride flush  5-40 mL IntraVENous 2 times per day    [Held by provider] gabapentin  300 mg Oral BID    latanoprost  1 drop Both Eyes Nightly    therapeutic multivitamin-minerals  1 tablet Oral Daily    pantoprazole (PROTONIX) 40 mg in sodium chloride (PF) 0.9 % 10 mL injection  40 mg IntraVENous Daily     sulfur hexafluoride microspheres, 2 mL, ONCE PRN  sodium chloride flush, 5-40 mL, PRN  sodium chloride, , PRN  potassium chloride, 40 mEq, PRN   Or  potassium alternative oral replacement, 40 mEq, PRN   Or  potassium chloride, 10 mEq, PRN  magnesium sulfate, 2,000 mg, PRN  ondansetron, 4 mg, Q8H PRN   Or  ondansetron, 4 mg, Q6H PRN  polyethylene glycol, 17 g, Daily PRN  acetaminophen, 650 mg, Q6H PRN   Or  acetaminophen, 650 mg, Q6H PRN  glucose, 4 tablet, PRN  dextrose bolus, 125 mL, PRN   Or  dextrose bolus, 250 mL, PRN  glucagon 
       [facility] Hospitalist Progress Note    Admitting Date and Time: 3/17/2025 12:33 PM  Admit Dx: Ureterolithiasis [N20.1]  Acute renal insufficiency [N28.9]  Septic shock (HCC) [A41.9, R65.21]  Diarrhea, unspecified type [R19.7]  Sepsis, due to unspecified organism, unspecified whether acute organ dysfunction present (HCC) [A41.9]    Subjective:  Patient is being followed for Ureterolithiasis [N20.1]  Acute renal insufficiency [N28.9]  Septic shock (HCC) [A41.9, R65.21]  Diarrhea, unspecified type [R19.7]  Sepsis, due to unspecified organism, unspecified whether acute organ dysfunction present (HCC) [A41.9]     Patient is a critically ill and required a critical level of care.  No acute events overnight.  Patient looks lethargic today and she is on 3 mcg of Levophed.    ROS: Could not be obtained as the patient is confused.     heparin (porcine)  5,000 Units SubCUTAneous BID    sodium chloride flush  5-40 mL IntraVENous 2 times per day    [Held by provider] gabapentin  300 mg Oral BID    latanoprost  1 drop Both Eyes Nightly    [Held by provider] therapeutic multivitamin-minerals  1 tablet Oral Daily    cefepime  1,000 mg IntraVENous Q24H    pantoprazole (PROTONIX) 40 mg in sodium chloride (PF) 0.9 % 10 mL injection  40 mg IntraVENous Daily    midodrine  10 mg Oral BID WC     sulfur hexafluoride microspheres, 2 mL, ONCE PRN  sodium chloride flush, 5-40 mL, PRN  sodium chloride, , PRN  potassium chloride, 40 mEq, PRN   Or  potassium alternative oral replacement, 40 mEq, PRN   Or  potassium chloride, 10 mEq, PRN  magnesium sulfate, 2,000 mg, PRN  ondansetron, 4 mg, Q8H PRN   Or  ondansetron, 4 mg, Q6H PRN  polyethylene glycol, 17 g, Daily PRN  acetaminophen, 650 mg, Q6H PRN   Or  acetaminophen, 650 mg, Q6H PRN  glucose, 4 tablet, PRN  dextrose bolus, 125 mL, PRN   Or  dextrose bolus, 250 mL, PRN  glucagon (rDNA), 1 mg, PRN  dextrose, , Continuous PRN         Objective:    /62   Pulse 83   Temp 98.5 °F 
       [facility] Hospitalist Progress Note    Admitting Date and Time: 3/17/2025 12:33 PM  Admit Dx: Ureterolithiasis [N20.1]  Acute renal insufficiency [N28.9]  Septic shock (HCC) [A41.9, R65.21]  Diarrhea, unspecified type [R19.7]  Sepsis, due to unspecified organism, unspecified whether acute organ dysfunction present (HCC) [A41.9]    Subjective:  Patient is being followed for Ureterolithiasis [N20.1]  Acute renal insufficiency [N28.9]  Septic shock (HCC) [A41.9, R65.21]  Diarrhea, unspecified type [R19.7]  Sepsis, due to unspecified organism, unspecified whether acute organ dysfunction present (HCC) [A41.9]     Patient is a critically ill and required a critical level of care.  No acute events overnight.  Patient looks alert and oriented today and she is on 2 mcg of Levophed.    OS: denies fever, chills, cp, sob, n/v, HA unless stated above.      albumin human 25%  25 g IntraVENous Q6H    hydrocortisone sodium succinate PF  50 mg IntraVENous Q8H    midodrine  10 mg Oral TID WC    meropenem  500 mg IntraVENous Q12H    sodium chloride flush  5-40 mL IntraVENous 2 times per day    [Held by provider] gabapentin  300 mg Oral BID    latanoprost  1 drop Both Eyes Nightly    therapeutic multivitamin-minerals  1 tablet Oral Daily    pantoprazole (PROTONIX) 40 mg in sodium chloride (PF) 0.9 % 10 mL injection  40 mg IntraVENous Daily     sulfur hexafluoride microspheres, 2 mL, ONCE PRN  sodium chloride flush, 5-40 mL, PRN  sodium chloride, , PRN  potassium chloride, 40 mEq, PRN   Or  potassium alternative oral replacement, 40 mEq, PRN   Or  potassium chloride, 10 mEq, PRN  magnesium sulfate, 2,000 mg, PRN  ondansetron, 4 mg, Q8H PRN   Or  ondansetron, 4 mg, Q6H PRN  polyethylene glycol, 17 g, Daily PRN  acetaminophen, 650 mg, Q6H PRN   Or  acetaminophen, 650 mg, Q6H PRN  glucose, 4 tablet, PRN  dextrose bolus, 125 mL, PRN   Or  dextrose bolus, 250 mL, PRN  glucagon (rDNA), 1 mg, PRN  dextrose, , Continuous PRN     
    3/18/2025 9:54 AM  Dianne Narayan  [id number]    Subjective:    Family at the bedside  She is confused today and grabbing at things   Sitter is present  Donis with bloody urine     Review of Systems  Unable to accurately obtain due to confusion       Scheduled Meds:   heparin (porcine)  5,000 Units SubCUTAneous BID    sodium chloride flush  5-40 mL IntraVENous 2 times per day    [Held by provider] gabapentin  300 mg Oral BID    latanoprost  1 drop Both Eyes Nightly    [Held by provider] therapeutic multivitamin-minerals  1 tablet Oral Daily    cefepime  1,000 mg IntraVENous Q24H    pantoprazole (PROTONIX) 40 mg in sodium chloride (PF) 0.9 % 10 mL injection  40 mg IntraVENous Daily    midodrine  10 mg Oral BID WC       Objective:  Vitals:    03/18/25 0714   BP:    Pulse:    Resp:    Temp: 97.6 °F (36.4 °C)   SpO2:          Allergies: Latex, Penicillins, Asa [aspirin], Iodides, Lamictal [lamotrigine], Lincocin [lincomycin hcl], Mydriacyl [tropicamide], Other, Tegretol [carbamazepine], Torsemide, Achromycin [tetracycline], Bactrim [sulfamethoxazole-trimethoprim], Ciprofloxacin, Codeine, Erythromycin, Ketoconazole, and Motrin [ibuprofen]    General Appearance: eyes closed, confused, does not follow commands   Skin: no rash or erythema  Head: normocephalic and atraumatic  Pulmonary/Chest: normal air movement, no respiratory distress  Abdomen: soft, non-tender, non-distended  Genitourinary: donis with bloody urine   Extremities: no cyanosis, clubbing or edema         Labs:     Recent Labs     03/18/25  0516      K 4.4   *   CO2 23   BUN 46*   CREATININE 2.7*   GLUCOSE 150*   CALCIUM 8.0*       Lab Results   Component Value Date/Time    HGB 11.8 03/18/2025 05:16 AM    HCT 36.7 03/18/2025 05:16 AM         Assessment/Plan:  POD#1--cystoscopy, retrograde pyelogram, left stent insertion   8mm left proximal ureteral stone   Left hydronephrosis   ADORE   UTI   Sepsis       Continue to watch the creatinine 
    3/19/2025 12:02 PM  Dianne Narayan  [id number]    Subjective:    Family at the bedside  She is sitting up in a chair today   Donis with dark abdon urine in tubing     Review of Systems  Constitutional: No fever or chills, weak tired   Respiratory: negative for cough and hemoptysis  Cardiovascular: negative for chest pain and dyspnea  Gastrointestinal: negative for abdominal pain, diarrhea, nausea and vomiting   Derm: negative for rash and skin lesion(s)  Neurological: negative for seizures and tremors  Musculoskeletal: Negative            Psychiatric: Negative   : As above in the HPI, otherwise negative  All other reviews are negative      Scheduled Meds:   albumin human 25%  25 g IntraVENous Q6H    hydrocortisone sodium succinate PF  50 mg IntraVENous Q8H    midodrine  10 mg Oral TID WC    meropenem  500 mg IntraVENous Q12H    sodium chloride flush  5-40 mL IntraVENous 2 times per day    [Held by provider] gabapentin  300 mg Oral BID    latanoprost  1 drop Both Eyes Nightly    therapeutic multivitamin-minerals  1 tablet Oral Daily    pantoprazole (PROTONIX) 40 mg in sodium chloride (PF) 0.9 % 10 mL injection  40 mg IntraVENous Daily       Objective:  Vitals:    03/19/25 1200   BP:    Pulse: 68   Resp:    Temp:    SpO2:          Allergies: Latex, Penicillins, Asa [aspirin], Iodides, Lamictal [lamotrigine], Lincocin [lincomycin hcl], Mydriacyl [tropicamide], Other, Tegretol [carbamazepine], Torsemide, Achromycin [tetracycline], Bactrim [sulfamethoxazole-trimethoprim], Ciprofloxacin, Codeine, Erythromycin, Ketoconazole, and Motrin [ibuprofen]    General Appearance: awake and alert, confused  Skin: no rash or erythema  Head: normocephalic and atraumatic  Pulmonary/Chest: normal air movement, no respiratory distress  Abdomen: soft, non-tender, non-distended  Genitourinary: donis with dark abdon urine   Extremities: no cyanosis, clubbing or edema         Labs:     Recent Labs     03/19/25  0411      K 4.0 
    3/20/2025 3:16 PM  Dianne Narayan  [id number]    Subjective:    Family at the bedside  Laying in bed  Donis with pink tinged urine     Review of Systems  Constitutional: No fever or chills, weak tired   Respiratory: negative for cough and hemoptysis  Cardiovascular: negative for chest pain and dyspnea  Gastrointestinal: negative for abdominal pain, diarrhea, nausea and vomiting   Derm: negative for rash and skin lesion(s)  Neurological: negative for seizures and tremors  Musculoskeletal: Negative            Psychiatric: Negative   : As above in the HPI, otherwise negative  All other reviews are negative      Scheduled Meds:   cefTRIAXone (ROCEPHIN) IV  2,000 mg IntraVENous Q24H    amLODIPine  5 mg Oral Daily    sodium chloride flush  5-40 mL IntraVENous 2 times per day    melatonin  6 mg Oral Nightly    sodium chloride flush  5-40 mL IntraVENous 2 times per day    [Held by provider] gabapentin  300 mg Oral BID    latanoprost  1 drop Both Eyes Nightly    therapeutic multivitamin-minerals  1 tablet Oral Daily    pantoprazole (PROTONIX) 40 mg in sodium chloride (PF) 0.9 % 10 mL injection  40 mg IntraVENous Daily       Objective:  Vitals:    03/20/25 1400   BP: (!) 156/83   Pulse: 68   Resp: 17   Temp:    SpO2: 91%         Allergies: Latex, Penicillins, Asa [aspirin], Iodides, Lamictal [lamotrigine], Lincocin [lincomycin hcl], Mydriacyl [tropicamide], Other, Tegretol [carbamazepine], Torsemide, Achromycin [tetracycline], Bactrim [sulfamethoxazole-trimethoprim], Ciprofloxacin, Codeine, Erythromycin, Ketoconazole, and Motrin [ibuprofen]    General Appearance: sleeping, no distress   Skin: no rash or erythema  Head: normocephalic and atraumatic  Pulmonary/Chest: normal air movement, no respiratory distress  Abdomen: soft, non-tender, non-distended  Genitourinary: donis with pink tinged urine   Extremities: no cyanosis, clubbing or edema         Labs:     Recent Labs     03/20/25  0402      K 4.0    
    3/21/2025 12:58 PM  Dianne Narayan  [id number]    Subjective:    Awake and alert  Donis with yellow urine today     Review of Systems  Constitutional: No fever or chills, weak tired   Respiratory: negative for cough and hemoptysis  Cardiovascular: negative for chest pain and dyspnea  Gastrointestinal: negative for abdominal pain, diarrhea, nausea and vomiting   Derm: negative for rash and skin lesion(s)  Neurological: negative for seizures and tremors  Musculoskeletal: Negative            Psychiatric: Negative   : As above in the HPI, otherwise negative  All other reviews are negative      Scheduled Meds:   acyclovir  200 mg Oral TID    cefTRIAXone (ROCEPHIN) IV  2,000 mg IntraVENous Q24H    amLODIPine  5 mg Oral Daily    sodium chloride flush  5-40 mL IntraVENous 2 times per day    melatonin  6 mg Oral Nightly    sodium chloride flush  5-40 mL IntraVENous 2 times per day    [Held by provider] gabapentin  300 mg Oral BID    latanoprost  1 drop Both Eyes Nightly    therapeutic multivitamin-minerals  1 tablet Oral Daily    pantoprazole (PROTONIX) 40 mg in sodium chloride (PF) 0.9 % 10 mL injection  40 mg IntraVENous Daily       Objective:  Vitals:    03/21/25 1100   BP: (!) 171/77   Pulse: 67   Resp: 16   Temp: 98.3 °F (36.8 °C)   SpO2: 91%         Allergies: Latex, Penicillins, Asa [aspirin], Iodides, Lamictal [lamotrigine], Lincocin [lincomycin hcl], Mydriacyl [tropicamide], Other, Tegretol [carbamazepine], Torsemide, Achromycin [tetracycline], Bactrim [sulfamethoxazole-trimethoprim], Ciprofloxacin, Codeine, Erythromycin, Ketoconazole, and Motrin [ibuprofen]    General Appearance: sleeping, no distress   Skin: no rash or erythema  Head: normocephalic and atraumatic  Pulmonary/Chest: normal air movement, no respiratory distress  Abdomen: soft, non-tender, non-distended  Genitourinary: donis with yellow urine   Extremities: no cyanosis, clubbing or edema         Labs:     Recent Labs     03/21/25  0420   NA 
    NAME: Dianne Narayan  MR:  [id number]  :   1935  Admit Date:  3/17/2025    Elements of this note, were copied and pasted from Previous. Updates have been made where noted and reflect current exam and medical decision making from the DOS of this encounter.  CHIEF COMPLAINT       Chief Complaint   Patient presents with    Fatigue     Started yesterday afternoon with diarrhea. Patient is hypotensive and hypoxic.     Diarrhea     HISTORY OF PRESENT ILLNESS     Dianne Narayan is a 89 y.o. female admitted on 3/17/2025    Patient Active Problem List   Diagnosis    Sepsis (HCC)    Diarrhea    Kidney stone    Ureterolithiasis    Acute kidney injury    Lactic acid acidosis    Elevated troponin level not due myocardial infarction    Transaminitis    Bandemia    Thrombocytopenia    Intermittent asthma with acute exacerbation    Glaucoma    Benign neoplasm of brain (HCC)    Trigeminal neuralgia    Acute renal insufficiency       This is a face to face encounter   25 in bed has no c/o f/c/n/v/d  3/24 awake and alert has no c/o   3/23  present updae p oc pt is awake and alert  3/22 afebrile 2L wbc15 cr1.2  feels better awake and alert  3/21 in bed being cleaned up informed of rash on face appears has vesicles  3/20 in bed has sitter nad   3/19 IN BED more awake alert has sitter   3/18 Pt has sitter confused afebrile ra         Assessment & Plan     Admitted for   Ureterolithiasis [N20.1]  Acute renal insufficiency [N28.9]  Septic shock ([facility]) [A41.9, R65.21]  Diarrhea, unspecified type [R19.7]  Sepsis, due to unspecified organism, unspecified whether acute organ dysfunction present ([facility]) [A41.9]    ID following for   Sepsis improved  Leukocytosis follow   E coli  septicmeia PAN (S)  moderate left hydronephrosis with   obstructing 8 mm proximal to mid left ureteral calculus.   S/p CYSTOSCOPY RETROGRADE PYELOGRAM LEFT STENT INSERTION   F/u blood blcx neg     Rash on face vesicles - acyclovir cover 
    NAME: Dianne Narayan  MR:  [id number]  :   1935  Admit Date:  3/17/2025    Elements of this note, were copied and pasted from Previous. Updates have been made where noted and reflect current exam and medical decision making from the DOS of this encounter.  CHIEF COMPLAINT       Chief Complaint   Patient presents with    Fatigue     Started yesterday afternoon with diarrhea. Patient is hypotensive and hypoxic.     Diarrhea     HISTORY OF PRESENT ILLNESS     Dianne Narayan is a 89 y.o. female admitted on 3/17/2025    Patient Active Problem List   Diagnosis    Septic shock (HCC)    Diarrhea    Kidney stone    Ureterolithiasis    Acute kidney injury    Lactic acid acidosis    Elevated troponin level not due myocardial infarction    Transaminitis    Bandemia    Thrombocytopenia    Intermittent asthma with acute exacerbation    Glaucoma    Benign neoplasm of brain (HCC)    Trigeminal neuralgia    Acute renal insufficiency       This is a face to face encounter   25 in bed has sitter nad   3/19 IN BED more awake alert has sitter   3/18 Pt has sitter confused afebrile ra         Assessment & Plan     Admitted for   Ureterolithiasis [N20.1]  Acute renal insufficiency [N28.9]  Septic shock (HCC) [A41.9, R65.21]  Diarrhea, unspecified type [R19.7]  Sepsis, due to unspecified organism, unspecified whether acute organ dysfunction present (HCC) [A41.9]    ID following for   Sepsis   Leukocytosis consider HEME to see  E coli  septicmeia PAN (S)  moderate left hydronephrosis with   obstructing 8 mm proximal to mid left ureteral calculus.   S/p CYSTOSCOPY RETROGRADE PYELOGRAM LEFT STENT INSERTION   Patient is tolerating medications. No reported adverse drug reactions.    Antimicrobials:   meropenem swicth ceftriaxone   Med rec    Follow MS    Monitor for therapeutic and adverse effects to intravenous antibiotics.   Available labs, imaging studies, microbiologic studies have been reviewed with pt and family if 
    NAME: Dianne Narayan  MR:  [id number]  :   1935  Admit Date:  3/17/2025    Elements of this note, were copied and pasted from Previous. Updates have been made where noted and reflect current exam and medical decision making from the DOS of this encounter.  CHIEF COMPLAINT       Chief Complaint   Patient presents with    Fatigue     Started yesterday afternoon with diarrhea. Patient is hypotensive and hypoxic.     Diarrhea     HISTORY OF PRESENT ILLNESS     Dianne Narayan is a 89 y.o. female admitted on 3/17/2025    Patient Active Problem List   Diagnosis    Sepsis (HCC)    Diarrhea    Kidney stone    Ureterolithiasis    Acute kidney injury    Lactic acid acidosis    Elevated troponin level not due myocardial infarction    Transaminitis    Bandemia    Thrombocytopenia    Intermittent asthma with acute exacerbation    Glaucoma    Benign neoplasm of brain (HCC)    Trigeminal neuralgia       This is a face to face encounter   25 pt has sitter confused afebrile ra         Assessment & Plan     Admitted for   Ureterolithiasis [N20.1]  Acute renal insufficiency [N28.9]  Septic shock ([facility]) [A41.9, R65.21]  Diarrhea, unspecified type [R19.7]  Sepsis, due to unspecified organism, unspecified whether acute organ dysfunction present ([facility]) [A41.9]    ID following for   Sepsis   GN septicmeia moderate left hydronephrosis with   obstructing 8 mm proximal to mid left ureteral calculus.   S/p CYSTOSCOPY RETROGRADE PYELOGRAM LEFT STENT INSERTION   Patient is tolerating medications. No reported adverse drug reactions.    Antimicrobials:      cefepime (MAXIPIME) 1,000 mg in sodium chloride 0.9 % 50 mL IVPB (addEASE), Q24H  Switch to meropenem   Follow MS    Monitor for therapeutic and adverse effects to intravenous antibiotics.   Available labs, imaging studies, microbiologic studies have been reviewed with pt and family if present.    Please note that 30 minutes were spent on this case in regards to the 
    NAME: Dianne Narayan  MR:  [id number]  :   1935  Admit Date:  3/17/2025    Elements of this note, were copied and pasted from Previous. Updates have been made where noted and reflect current exam and medical decision making from the DOS of this encounter.  CHIEF COMPLAINT       Chief Complaint   Patient presents with    Fatigue     Started yesterday afternoon with diarrhea. Patient is hypotensive and hypoxic.     Diarrhea     HISTORY OF PRESENT ILLNESS     Dianne Narayan is a 89 y.o. female admitted on 3/17/2025    Patient Active Problem List   Diagnosis    Septic shock (HCC)    Diarrhea    Kidney stone    Ureterolithiasis    Acute kidney injury    Lactic acid acidosis    Elevated troponin level not due myocardial infarction    Transaminitis    Bandemia    Thrombocytopenia    Intermittent asthma with acute exacerbation    Glaucoma    Benign neoplasm of brain (HCC)    Trigeminal neuralgia    Acute renal insufficiency       This is a face to face encounter   25 IN BED more awake alert has sitter   3/18 Pt has sitter confused afebrile ra         Assessment & Plan     Admitted for   Ureterolithiasis [N20.1]  Acute renal insufficiency [N28.9]  Septic shock (HCC) [A41.9, R65.21]  Diarrhea, unspecified type [R19.7]  Sepsis, due to unspecified organism, unspecified whether acute organ dysfunction present (HCC) [A41.9]    ID following for   Sepsis   Leukocytosis consider HEME to see  GN septicmeia moderate left hydronephrosis with   obstructing 8 mm proximal to mid left ureteral calculus.   S/p CYSTOSCOPY RETROGRADE PYELOGRAM LEFT STENT INSERTION   Patient is tolerating medications. No reported adverse drug reactions.    Antimicrobials:   meropenem (MERREM) 500 mg in sodium chloride 0.9 % 100 mL IVPB (addEASE), Q12H    Follow MS    Monitor for therapeutic and adverse effects to intravenous antibiotics.   Available labs, imaging studies, microbiologic studies have been reviewed with pt and family if 
    NAME: Dianne Narayan  MR:  [id number]  :   1935  Admit Date:  3/17/2025    Elements of this note, were copied and pasted from Previous. Updates have been made where noted and reflect current exam and medical decision making from the DOS of this encounter.  CHIEF COMPLAINT       Chief Complaint   Patient presents with    Fatigue     Started yesterday afternoon with diarrhea. Patient is hypotensive and hypoxic.     Diarrhea     HISTORY OF PRESENT ILLNESS     Dianne Narayan is a 89 y.o. female admitted on 3/17/2025    Patient Active Problem List   Diagnosis    Septic shock (HCC)    Diarrhea    Kidney stone    Ureterolithiasis    Acute kidney injury    Lactic acid acidosis    Elevated troponin level not due myocardial infarction    Transaminitis    Bandemia    Thrombocytopenia    Intermittent asthma with acute exacerbation    Glaucoma    Benign neoplasm of brain (HCC)    Trigeminal neuralgia    Acute renal insufficiency       This is a face to face encounter   25 in bed being cleaned up informed of rash on face appears has vesicles  3/20 in bed has sitter nad   3/19 IN BED more awake alert has sitter   3/18 Pt has sitter confused afebrile ra         Assessment & Plan     Admitted for   Ureterolithiasis [N20.1]  Acute renal insufficiency [N28.9]  Septic shock (HCC) [A41.9, R65.21]  Diarrhea, unspecified type [R19.7]  Sepsis, due to unspecified organism, unspecified whether acute organ dysfunction present (HCC) [A41.9]    ID following for   Sepsis   Leukocytosis consider HEME to see  E coli  septicmeia PAN (S)  moderate left hydronephrosis with   obstructing 8 mm proximal to mid left ureteral calculus.   S/p CYSTOSCOPY RETROGRADE PYELOGRAM LEFT STENT INSERTION   Patient is tolerating medications. No reported adverse drug reactions.  Rash on face vesicles - start acyclovir     Antimicrobials:   ceftriaxone   Med rec      Monitor for therapeutic and adverse effects to intravenous antibiotics. 
    NAME: Dianne Narayan  MR:  [id number]  :   1935  Admit Date:  3/17/2025    Elements of this note, were copied and pasted from Previous. Updates have been made where noted and reflect current exam and medical decision making from the DOS of this encounter.  CHIEF COMPLAINT       Chief Complaint   Patient presents with    Fatigue     Started yesterday afternoon with diarrhea. Patient is hypotensive and hypoxic.     Diarrhea     HISTORY OF PRESENT ILLNESS     Dianne Narayan is a 89 y.o. female admitted on 3/17/2025    Patient Active Problem List   Diagnosis    Sepsis (HCC)    Diarrhea    Kidney stone    Ureterolithiasis    Acute kidney injury    Lactic acid acidosis    Elevated troponin level not due myocardial infarction    Transaminitis    Bandemia    Thrombocytopenia    Intermittent asthma with acute exacerbation    Glaucoma    Benign neoplasm of brain (HCC)    Trigeminal neuralgia    Acute renal insufficiency       This is a face to face encounter   25  present updae p oc pt is awake and alert  3/22 afebrile 2L wbc15 cr1.2  feels better awake and alert  3/21 in bed being cleaned up informed of rash on face appears has vesicles  3/20 in bed has sitter nad   3/19 IN BED more awake alert has sitter   3/18 Pt has sitter confused afebrile ra         Assessment & Plan     Admitted for   Ureterolithiasis [N20.1]  Acute renal insufficiency [N28.9]  Septic shock (HCC) [A41.9, R65.21]  Diarrhea, unspecified type [R19.7]  Sepsis, due to unspecified organism, unspecified whether acute organ dysfunction present (HCC) [A41.9]    ID following for   Sepsis   Leukocytosis trending down   E coli  septicmeia PAN (S)  moderate left hydronephrosis with   obstructing 8 mm proximal to mid left ureteral calculus.   S/p CYSTOSCOPY RETROGRADE PYELOGRAM LEFT STENT INSERTION   F/u blood blcx neg     Rash on face vesicles - start acyclovir cover HSV  Patient is tolerating medications. No reported adverse drug 
    NAME: Dianne Narayan  MR:  [id number]  :   1935  Admit Date:  3/17/2025    Elements of this note, were copied and pasted from Previous. Updates have been made where noted and reflect current exam and medical decision making from the DOS of this encounter.  CHIEF COMPLAINT       Chief Complaint   Patient presents with    Fatigue     Started yesterday afternoon with diarrhea. Patient is hypotensive and hypoxic.     Diarrhea     HISTORY OF PRESENT ILLNESS     Dianne Narayan is a 89 y.o. female admitted on 3/17/2025    Patient Active Problem List   Diagnosis    Sepsis (HCC)    Diarrhea    Kidney stone    Ureterolithiasis    Acute kidney injury    Lactic acid acidosis    Elevated troponin level not due myocardial infarction    Transaminitis    Bandemia    Thrombocytopenia    Intermittent asthma with acute exacerbation    Glaucoma    Benign neoplasm of brain (HCC)    Trigeminal neuralgia    Acute renal insufficiency       This is a face to face encounter   25- has been afebrile. On 3 liters nasal canula- still with lesions to left side of face.     3/25/2025- in bed has no c/o f/c/n/v/d  3/24 awake and alert has no c/o   3/23  present updae p oc pt is awake and alert  3/22 afebrile 2L wbc15 cr1.2  feels better awake and alert  3/21 in bed being cleaned up informed of rash on face appears has vesicles  3/20 in bed has sitter nad   3/19 IN BED more awake alert has sitter   3/18 Pt has sitter confused afebrile ra         Assessment & Plan     Admitted for   Ureterolithiasis [N20.1]  Acute renal insufficiency [N28.9]  Septic shock (HCC) [A41.9, R65.21]  Diarrhea, unspecified type [R19.7]  Sepsis, due to unspecified organism, unspecified whether acute organ dysfunction present (HCC) [A41.9]    ID following for   Sepsis improved  Leukocytosis - improved   E coli  septicemia PAN (S)  moderate left hydronephrosis with   obstructing 8 mm proximal to mid left ureteral calculus.   S/p CYSTOSCOPY 
    This patient is on DVT Prophylaxis medication that requires a dose adjustment      Date Body Weight IBW  Adjusted BW SCr  CrCl Dialysis status   3/17/2025 73.7 kg (162 lb 8 oz) Ideal body weight: 61.6 kg (135 lb 12.9 oz)  Adjusted ideal body weight: 66.4 kg (146 lb 7.7 oz) Serum creatinine: 2.1 mg/dL (H) 03/17/25 1300  Estimated creatinine clearance: 18 mL/min (A) N/a       Pharmacy has dose-adjusted the DVT Prophylaxis regimen to match   the recommendations from the following table        Ordered Medication:Lovenox 40mg daily    Order Changed/converted to: Lovenox 30mg daily    These changes were made per protocol according to the [facility] Pharmacist   Review for Appropriate Use and Automatic Dose Adjustments of   Subcutaneous Anticoagulants Policy     *Please note this dose may need readjusted if patient's condition changes.    Please contact pharmacy with any questions regarding these changes.    Riya Francois, PharmD.  3/17/2025 6:18 PM    MICHAEL: [phone]       
    This patient is on medication that requires renal, weight, and/or indication dose adjustment.      Date Body Weight IBW  Adjusted BW SCr  CrCl Dialysis status BMI   3/17/2025 73.7 kg (162 lb 8 oz) Ideal body weight: 61.6 kg (135 lb 12.9 oz)  Adjusted ideal body weight: 66.4 kg (146 lb 7.7 oz) Serum creatinine: 2.1 mg/dL (H) 03/17/25 1300  Estimated creatinine clearance: 18 mL/min (A) N/a Body mass index is 25.45 kg/m².       Pharmacy has dose-adjusted the following medication(s):    Ordered Medication: Cefepime 2000mg q12h     Order Changed/converted to: Cefepime 1000mg q24h    These changes were made per protocol according to the [facility]   Automatic Extended Infusion Dose Adjustment Policy.     *Please note this dose may need readjusted if patient's condition changes.    Please contact pharmacy with any questions regarding these changes.    Riya Francois, PharmD.  3/17/2025 6:21 PM    SJSHERIDAN: [phone]    
4 Eyes Skin Assessment     NAME:  Dianne Narayan  YOB: 1935  MEDICAL RECORD NUMBER:  [id number]    The patient is being assessed for  Admission    I agree that at least one RN has performed a thorough Head to Toe Skin Assessment on the patient. ALL assessment sites listed below have been assessed.      Areas assessed by both nurses:    Head, Face, Ears, Shoulders, Back, Chest, Arms, Elbows, Hands, Sacrum. Buttock, Coccyx, Ischium, Legs. Feet and Heels, and Under Medical Devices         Does the Patient have a Wound? No noted wound(s)           Lencho Prevention initiated by RN: Yes  Wound Care Orders initiated by RN: No    Pressure Injury (Stage 3,4, Unstageable, DTI, NWPT, and Complex wounds) if present, place Wound referral order by RN under : No    New Ostomies, if present place, Ostomy referral order under : No     Nurse 1 eSignature: Electronically signed by Atiya Bertrand RN on 3/17/25 at 5:45 PM EDT    **SHARE this note so that the co-signing nurse can place an eSignature**    Nurse 2 eSignature: Electronically signed by Kimberlyn Smith RN on 3/17/25 at 7:08 PM EDT    
Admitting Date and Time: 3/17/2025 12:33 PM  Admit Dx: Ureterolithiasis [N20.1]  Acute renal insufficiency [N28.9]  Septic shock (HCC) [A41.9, R65.21]  Diarrhea, unspecified type [R19.7]  Sepsis, due to unspecified organism, unspecified whether acute organ dysfunction present (HCC) [A41.9]    Subjective:    Pt feels ready to exercise was getting therapy before hospitalization  Small difficult bowel movement today  Per RN: Getting Arixtra    ROS: denies fever, chills, cp, sob, n/v, HA unless stated above.     fondaparinux  2.5 mg SubCUTAneous Daily    pantoprazole  40 mg Oral QAM AC    amLODIPine  10 mg Oral Daily    docusate sodium  100 mg Oral BID    polyethylene glycol  17 g Oral BID    acyclovir  200 mg Oral TID    cefTRIAXone (ROCEPHIN) IV  2,000 mg IntraVENous Q24H    sodium chloride flush  5-40 mL IntraVENous 2 times per day    melatonin  6 mg Oral Nightly    gabapentin  300 mg Oral BID    latanoprost  1 drop Both Eyes Nightly    therapeutic multivitamin-minerals  1 tablet Oral Daily     diphenhydrAMINE, 25 mg, Q6H PRN  sulfur hexafluoride microspheres, 2 mL, ONCE PRN  sodium chloride flush, 5-40 mL, PRN  sodium chloride, , PRN  potassium chloride, 40 mEq, PRN   Or  potassium alternative oral replacement, 40 mEq, PRN   Or  potassium chloride, 10 mEq, PRN  magnesium sulfate, 2,000 mg, PRN  ondansetron, 4 mg, Q8H PRN   Or  ondansetron, 4 mg, Q6H PRN  polyethylene glycol, 17 g, Daily PRN  acetaminophen, 650 mg, Q6H PRN   Or  acetaminophen, 650 mg, Q6H PRN  glucose, 4 tablet, PRN  dextrose bolus, 125 mL, PRN   Or  dextrose bolus, 250 mL, PRN  glucagon (rDNA), 1 mg, PRN  dextrose, , Continuous PRN         Objective:    BP (!) 146/52   Pulse 76   Temp 97.5 °F (36.4 °C) (Oral)   Resp 20   Ht 1.702 m (5' 7.01\")   Wt 81.7 kg (180 lb 3.2 oz)   SpO2 91%   BMI 28.22 kg/m²   General Appearance: alert and oriented to person, place and time and in no acute distress  Skin: warm and dry  Head: normocephalic and 
[facility]   IN-PATIENT SERVICE      Progress Note    3/21/2025    2:46 PM    Name:   Dianne Narayan  MRN:     [id number]     Acct:      [id number]   Room:   [address] Day:  4  Admit Date:  3/17/2025 12:33 PM    PCP:   Velasquez Marshall MD  Code Status:  Full Code    Subjective:     C/C:   Chief Complaint   Patient presents with    Fatigue     Started yesterday afternoon with diarrhea. Patient is hypotensive and hypoxic.     Diarrhea     Interval History Status: improved.     obstructing 8 mm proximal to mid left ureteral calculus.   S/p CYSTOSCOPY RETROGRADE PYELOGRAM LEFT STENT INSERTION   E. coli bacteremia-septic shock    Brief History:      A 89-year-old female with past medical history of hypertension, trigeminal neuralgia and peripheral neuropathy who presents from home with acute onset of left flank pain     Review of Systems:     Constitutional:  negative for chills, fevers, sweats  Respiratory:  negative for cough, dyspnea on exertion, shortness of breath, wheezing  Cardiovascular:  negative for chest pain, chest pressure/discomfort, lower extremity edema, palpitations  Gastrointestinal:  negative for abdominal pain, constipation, diarrhea, nausea, vomiting  Neurological:  negative for dizziness, headache    Medications:     Allergies:    Allergies   Allergen Reactions    Latex Rash    Penicillins Swelling    Asa [Aspirin] Nausea Only    Iodides     Lamictal [Lamotrigine]     Lincocin [Lincomycin Hcl]     Mydriacyl [Tropicamide]     Other      AK-DILATE  CIPRO TABLETS    Tegretol [Carbamazepine]     Torsemide     Achromycin [Tetracycline] Rash    Bactrim [Sulfamethoxazole-Trimethoprim] Rash    Ciprofloxacin Rash    Codeine Rash    Erythromycin Rash    Ketoconazole Rash    Motrin [Ibuprofen] Rash       Current Meds:   Scheduled Meds:    acyclovir  200 mg Oral TID    cefTRIAXone (ROCEPHIN) IV  2,000 mg IntraVENous Q24H    amLODIPine  5 mg Oral Daily    sodium 
Antibiotic Extended Infusion Policy     This patient is on medication that requires renal, weight, and/or indication dose adjustment.      Date Body Weight IBW  Adjusted BW SCr  CrCl Dialysis status BMI   3/18/2025 76 kg (167 lb 8.8 oz) Ideal body weight: 61.6 kg (135 lb 12.9 oz)  Adjusted ideal body weight: 67.4 kg (148 lb 8 oz) Serum creatinine: 2.7 mg/dL (H) 03/18/25 0516  Estimated creatinine clearance: 16 mL/min (A) N/a Body mass index is 26.24 kg/m².       Pharmacy has dose-adjusted the following medication(s):    Ordered Medication: Meropenem 1000mg q12h     Order Changed/converted to: Meropenem 500mg q12h    These changes were made per protocol according to the [facility]   Automatic Extended Infusion Dose Adjustment Policy.     *Please note this dose may need readjusted if patient's condition changes.    Please contact pharmacy with any questions regarding these changes.    Jose Melissa [facility]  3/18/2025  1:49 PM    
Assessment:  Urosepsis   E. coli UTI with bacteremia.  Ureterolithiasis s/p cysto retro with Left stent on March 17, 2025  Septic shock requiring pressor support (resolved)  Acute Kidney injury 2/2 stone and hypoperfusion   Lactic acidosis (resolved)  Elevated troponin most likely ischemic demand  Transaminitis  Leukocytosis with shift to the left  Thrombocytopenia  Asthma  Glaucoma  Trigeminal Neuralgia  Brain Tumor  Cholelithiasis without cholecystitis  Descending and sigmoid colonic diverticulosis     PLAN:  Continue with ceftriaxone for E. coli with bacteremia.  Arixtra for DVT prophylaxis  Protonix for GI prophylaxis  Change IV fluid to half-normal saline at 75 cc an hour  ECHO with normal EF  HIT antibodies are pending.  Arixtra for DVT prophylaxis pending HIT workup  Continue gabapentin  Increase amlodipine to 10 mg daily  DISPOSITION:  ICU  CODE STATUS:  FULL CODE    History of Present Illness: Patient is a 89 y.o. female with the following medical Problems: Asthma, Gabriel tumor (colloid cyst), glaucoma, Hypertension, Trigeminal neuralgia.  Patient presented to the ED 3/17 with acute onset of left flank pain which started last night around 1700, associated with chills, nausea, vomiting and diarrhea.  ED workup showed patient to be hypotensive, with a creatine of 2.1, lactic of 4.7, and White count of 32.8.  Further investigation showed CT of abdomen and pelvis noted having a 8 mm obstructing proximal to mid left ureteral calculus.  Patient received a fluid bolus, a dose of Maxipime and was scheduled for a cysto Retro pyelogram with left stent insertion by Dr Gomez.  Admitted to the ICU for closer monitoring and severe sepsis and Septic Shock.       CT Abdomen/CT Head-There is no evidence of acute intracranial abnormality.Obstructing 8 mm proximal to mid left ureteral calculus.   Descending and sigmoid colonic diverticulosis, without evidence of  diverticulitis. Small amount of ascites in the pericolic 
Assessment:  Urosepsis   E. coli UTI with bacteremia.  Ureterolithiasis s/p cysto retro with Left stent on March 17, 2025  Septic shock requiring pressor support (resolved)  Acute Kidney injury 2/2 stone and hypoperfusion   Lactic acidosis (resolved)  Elevated troponin most likely ischemic demand  Transaminitis  Leukocytosis with shift to the left  Thrombocytopenia  Asthma  Glaucoma  Trigeminal Neuralgia  Brain Tumor  Cholelithiasis without cholecystitis  Descending and sigmoid colonic diverticulosis     PLAN:  Continue with ceftriaxone for E. coli with bacteremia.  ID team is managing antibiotics.  Patient has a complicated UTI and will need prolonged antibiotic course for at least 14 days  Protonix for GI prophylaxis  Discontinue IV fluid.  ECHO with normal EF  HIT antibodies are negative  Arixtra for DVT prophylaxis .  Patient can be safely switched to Lovenox if needed  Continue gabapentin  Increase amlodipine to 10 mg daily  DISPOSITION:  ICU  CODE STATUS:  FULL CODE    History of Present Illness: Patient is a 89 y.o. female with the following medical Problems: Asthma, Gabriel tumor (colloid cyst), glaucoma, Hypertension, Trigeminal neuralgia.  Patient presented to the ED 3/17 with acute onset of left flank pain which started last night around 1700, associated with chills, nausea, vomiting and diarrhea.  ED workup showed patient to be hypotensive, with a creatine of 2.1, lactic of 4.7, and White count of 32.8.  Further investigation showed CT of abdomen and pelvis noted having a 8 mm obstructing proximal to mid left ureteral calculus.  Patient received a fluid bolus, a dose of Maxipime and was scheduled for a cysto Retro pyelogram with left stent insertion by Dr Gomez.  Admitted to the ICU for closer monitoring and severe sepsis and Septic Shock.       CT Abdomen/CT Head-There is no evidence of acute intracranial abnormality.Obstructing 8 mm proximal to mid left ureteral calculus.   Descending and sigmoid 
Assessment:  Urosepsis   E. coli UTI with bacteremia.  Ureterolithiasis s/p cysto retro with Left stent on March 17, 2025  Septic shock requiring pressor support (resolved)  Acute Kidney injury 2/2 stone and hypoperfusion   Lactic acidosis (resolved)  Elevated troponin most likely ischemic demand  Transaminitis  Leukocytosis with shift to the left  Thrombocytopenia  Asthma  Glaucoma  Trigeminal Neuralgia  Brain Tumor  Cholelithiasis without cholecystitis  Descending and sigmoid colonic diverticulosis     PLAN:  Continue with ceftriaxone for E. coli with bacteremia.  ID team is managing antibiotics.  Patient has a complicated UTI.  Protonix for GI prophylaxis  Discontinue IV fluid.  ECHO with normal EF  HIT antibodies are pending.  Arixtra for DVT prophylaxis pending HIT workup  Continue gabapentin  Increase amlodipine to 10 mg daily  DISPOSITION:  ICU  CODE STATUS:  FULL CODE    History of Present Illness: Patient is a 89 y.o. female with the following medical Problems: Asthma, Gabriel tumor (colloid cyst), glaucoma, Hypertension, Trigeminal neuralgia.  Patient presented to the ED 3/17 with acute onset of left flank pain which started last night around 1700, associated with chills, nausea, vomiting and diarrhea.  ED workup showed patient to be hypotensive, with a creatine of 2.1, lactic of 4.7, and White count of 32.8.  Further investigation showed CT of abdomen and pelvis noted having a 8 mm obstructing proximal to mid left ureteral calculus.  Patient received a fluid bolus, a dose of Maxipime and was scheduled for a cysto Retro pyelogram with left stent insertion by Dr Gomez.  Admitted to the ICU for closer monitoring and severe sepsis and Septic Shock.       CT Abdomen/CT Head-There is no evidence of acute intracranial abnormality.Obstructing 8 mm proximal to mid left ureteral calculus.   Descending and sigmoid colonic diverticulosis, without evidence of  diverticulitis. Small amount of ascites in the pericolic 
Assessment:  Urosepsis   E. coli UTI with bacteremia.  Ureterolithiasis s/p cysto retro with Left stent on March 17, 2025  Septic shock requiring pressor support (resolved)  Acute Kidney injury 2/2 stone and hypoperfusion   Lactic acidosis (resolved)  Elevated troponin most likely ischemic demand  Transaminitis  Leukocytosis with shift to the left  Thrombocytopenia  Asthma  Glaucoma  Trigeminal Neuralgia  Brain Tumor  Cholelithiasis without cholecystitis  Descending and sigmoid colonic diverticulosis     PLAN:  S/P ceftriaxone for E. coli with bacteremia.  ID team is managing antibiotics.  Patient has a complicated UTI and will need prolonged antibiotic course for at least 14 days  Protonix for GI prophylaxis  Discontinue IV fluid.  ECHO with normal EF  HIT antibodies are negative  Arixtra for DVT prophylaxis .  Patient can be safely switched to Lovenox if needed  Continue gabapentin  Increase amlodipine to 10 mg daily  DISPOSITION:  ICU  CODE STATUS:  FULL CODE    History of Present Illness: Patient is a 89 y.o. female with the following medical Problems: Asthma, Gabriel tumor (colloid cyst), glaucoma, Hypertension, Trigeminal neuralgia.  Patient presented to the ED 3/17 with acute onset of left flank pain which started last night around 1700, associated with chills, nausea, vomiting and diarrhea.  ED workup showed patient to be hypotensive, with a creatine of 2.1, lactic of 4.7, and White count of 32.8.  Further investigation showed CT of abdomen and pelvis noted having a 8 mm obstructing proximal to mid left ureteral calculus.  Patient received a fluid bolus, a dose of Maxipime and was scheduled for a cysto Retro pyelogram with left stent insertion by Dr Gomez.  Admitted to the ICU for closer monitoring and severe sepsis and Septic Shock.       CT Abdomen/CT Head-There is no evidence of acute intracranial abnormality.Obstructing 8 mm proximal to mid left ureteral calculus.   Descending and sigmoid colonic 
Comprehensive Nutrition Assessment    Type and Reason for Visit:  Initial, LOS    Nutrition Recommendations/Plan:   Continue current diet and encourage PO intake   Recommend ONS Ensure Plus HP BID        Malnutrition Assessment:  Malnutrition Status:  At risk for malnutrition (03/21/25 0944)    Context:  Acute Illness     Findings of the 6 clinical characteristics of malnutrition:  Energy Intake:  50% or less of estimated energy requirements for 5 or more days  Weight Loss:  Unable to assess     Body Fat Loss:  No body fat loss     Muscle Mass Loss:  No muscle mass loss    Fluid Accumulation:  No fluid accumulation     Strength:  Not Performed    Nutrition Assessment:    Pt admit w/ septic shock & N/V/D 2/2 complicated UTI, obstructing L ureteral stone s/p cystoscopy/stent placement 3/17, L hydronephrosis & ADORE. Noted e.coli bacteremia. PMHx of asthma, brain tumor (colloid cyst), HTN, trigeminal neuralgia. Pt is on easy to chew diet, intake poor 0-25% of meals. Will add ONS and encourage intake as tolerated.    Nutrition Related Findings:    abd WDL, no edema noted, I/O's +9.4L since admit, A&Ox4 Wound Type: None       Current Nutrition Intake & Therapies:    Average Meal Intake: 0%, 1-25%  Average Supplements Intake: None Ordered  ADULT DIET; Easy to Chew    Anthropometric Measures:  Height: 170.2 cm (5' 7.01\")  Ideal Body Weight (IBW): 135 lbs (61 kg)    Admission Body Weight: 73.7 kg (162 lb 8 oz) (3/17 bed scale)  Current Body Weight: 76.1 kg (167 lb 12.3 oz) (3/21 bed scale), 124.3 % IBW. Weight Source: Bed scale  Current BMI (kg/m2): 26.3  Usual Body Weight:  (BRITTANI d/t limited wt hx per EMR)        Weight Adjustment For: No Adjustment                 BMI Categories: Overweight (BMI 25.0-29.9)    Estimated Daily Nutrient Needs:  Energy Requirements Based On: Formula  Weight Used for Energy Requirements: Current  Energy (kcal/day): 1500-1700kcal/day  Weight Used for Protein Requirements: Ideal  Protein 
Marlenaserved Dr. Rodriguez about isolation concerns. Awaiting return call for clarification.  
Notified Dr. Rodriguez about rash on face and swollen lip after giving Rocephin yesterday.  
Notified Monie NP about patient's face becoming reddened especially on the left side and her middle upper lip becoming more swollen looking.  
OCCUPATIONAL THERAPY INITIAL EVALUATION    [facility]  [address] [place]        Date:3/20/2025                                                  Patient Name: Dianne Narayan    MRN: [id number]    : 1935    Room: [place] [address]      Evaluating OT: Nick Pulliam OTR/L; #[id number]     Referring Provider and Specific Provider Orders/Date:      25  OT eval and treat  Start:  25,   End:  25,   ONE TIME,   Standing Count:  1 Occurrences,   R         Monie Pierre, APRN - CNP        Placement Recommendation: Subacute Rehab       Diagnosis:   1. Sepsis, due to unspecified organism, unspecified whether acute organ dysfunction present (HCC)    2. Ureterolithiasis    3. Acute renal insufficiency    4. Diarrhea, unspecified type    5. Kidney stone    6. Bandemia    7. Elevated troponin level not due myocardial infarction    8. Septic shock (HCC)    9. Thrombocytopenia         Surgery: None      Pertinent Medical History:       Past Medical History:   Diagnosis Date    Asthma     Brain tumor (benign) (HCC)     colloid cyst    Glaucoma     Hypertension     Trigeminal neuralgia          Past Surgical History:   Procedure Laterality Date    APPENDECTOMY      DEBRIDEMENT Left 3/17/2025    CYSTOSCOPY RETROGRADE PYELOGRAM LEFT STENT INSERTION performed by Daljit Gomez MD at [facility] OR    HYSTERECTOMY (CERVIX STATUS UNKNOWN)      JOINT REPLACEMENT      OTHER SURGICAL HISTORY      balloon compression for trigeminal neuralgia x 4    TONSILLECTOMY          Precautions:  Up with assistance, falls and Septic Shock, AMS, UTI       Assessment of current deficits:     [x] Functional mobility  [x]ADLs  [x] Strength               [x]Cognition    [x] Functional transfers   [x] IADLs         [] Safety Awareness   [x]Endurance    [] Fine Coordination              [x] Balance      [] Vision/perception   []Sensation     []Gross Motor Coordination 
Occupational Therapy  OCCUPATIONAL THERAPY TREATMENT NOTE    [facility]  [address] [facility]. [facility]  [address] [place], [place]   OT BEDSIDE TREATMENT NOTE      Date:3/24/2025  Patient Name: Dianne Narayan  MRN: [id number]  : 1935  Room: [address] [place] [address]       Evaluating OT: Nick Pulliam OTR/L; #[id number]      Referring Provider and Specific Provider Orders/Date:      25   OT eval and treat  Start:  25,   End:  25,   ONE TIME,   Standing Count:  1 Occurrences,   R         Monie Pierre, APRN - CNP         Placement Recommendation: Subacute Rehab        Diagnosis:   1. Sepsis, due to unspecified organism, unspecified whether acute organ dysfunction present (HCC)    2. Ureterolithiasis    3. Acute renal insufficiency    4. Diarrhea, unspecified type    5. Kidney stone    6. Bandemia    7. Elevated troponin level not due myocardial infarction    8. Septic shock (HCC)    9. Thrombocytopenia         Surgery: None       Pertinent Medical History:       Past Medical History        Past Medical History:   Diagnosis Date    Asthma      Brain tumor (benign) (HCC)       colloid cyst    Glaucoma      Hypertension      Trigeminal neuralgia              Past Surgical History         Past Surgical History:   Procedure Laterality Date    APPENDECTOMY        DEBRIDEMENT Left 3/17/2025     CYSTOSCOPY RETROGRADE PYELOGRAM LEFT STENT INSERTION performed by Daljit Gomez MD at [facility] OR    HYSTERECTOMY (CERVIX STATUS UNKNOWN)        JOINT REPLACEMENT        OTHER SURGICAL HISTORY         balloon compression for trigeminal neuralgia x 4    TONSILLECTOMY                Precautions:  Up with assistance, falls and Septic Shock, AMS, UTI, 2L O2        Assessment of current deficits:     [x] Functional mobility            [x]ADLs           [x] Strength                   [x]Cognition    [x] Functional 
Patient sat at edge of the bed for 5 minutes. Two Rn's attempted to have her up to chair, pt refused. Pt stood for 2 seconds and insisted on getting back in bed.   
Per Dr. Rodriguez continue rocephin without benadryl and observe for worsening of rash or any new symptoms.  
Physical Therapy  Physical Therapy Treatment Note/Plan of Care    Room #:  0616/0616-02  Patient Name: Dianne Narayan  YOB: 1935  MRN: [id number]    Date of Service: 3/24/2025     Tentative placement recommendation: Subacute Rehab  Equipment recommendation: To be determined      Evaluating Physical Therapist: Maxwell Brandon PT, DPT #[id number]      Specific Provider Orders/Date/Referring Provider :     03/19/25 0630    PT eval and treat  Start:  03/19/25 0630,   End:  03/19/25 0630,   ONE TIME,   Standing Count:  1 Occurrences,   R       Monie Pierre, APRN - CNP Acknowledge New    Admitting Diagnosis:   Ureterolithiasis [N20.1]  Acute renal insufficiency [N28.9]  Septic shock (HCC) [A41.9, R65.21]  Diarrhea, unspecified type [R19.7]  Sepsis, due to unspecified organism, unspecified whether acute organ dysfunction present (HCC) [A41.9]      Surgery:  Cystoscopy on 3/17/25  Visit Diagnoses         Codes      E coli bacteremia     R78.81, B96.20            Patient Active Problem List   Diagnosis    Sepsis (HCC)    Diarrhea    Kidney stone    Ureterolithiasis    Acute kidney injury    Lactic acid acidosis    Elevated troponin level not due myocardial infarction    Transaminitis    Bandemia    Thrombocytopenia    Intermittent asthma with acute exacerbation    Glaucoma    Benign neoplasm of brain (HCC)    Trigeminal neuralgia    Acute renal insufficiency        ASSESSMENT of Current Deficits Patient exhibits decreased strength, balance, and endurance impairing functional mobility, transfers, gait , gait distance, and tolerance to activity. Patient needing moderate assist for all functional mobility. Pt displays impaired strength with BLE's; her first stand was only 5 seconds; her second stand was longer and took steps to the chair; but as soon as she reached the chair; she turned and \"plopped\" in the chair. Pt fatigues quickly with minimal exertion needing rehab to address the deficits and increase 
Physical Therapy  Physical Therapy Treatment Note/Plan of Care    Room #:  IC04/IC04-01  Patient Name: Dianne Narayan  YOB: 1935  MRN: [id number]    Date of Service: 3/20/2025     Tentative placement recommendation: Subacute Rehab  Equipment recommendation: To be determined      Evaluating Physical Therapist: Maxwell Brandon, PT, DPT #[id number]      Specific Provider Orders/Date/Referring Provider :     03/19/25 0630    PT eval and treat  Start:  03/19/25 0630,   End:  03/19/25 0630,   ONE TIME,   Standing Count:  1 Occurrences,   R       Monie Pierre, APRN - CNP Acknowledge New    Admitting Diagnosis:   Ureterolithiasis [N20.1]  Acute renal insufficiency [N28.9]  Septic shock (HCC) [A41.9, R65.21]  Diarrhea, unspecified type [R19.7]  Sepsis, due to unspecified organism, unspecified whether acute organ dysfunction present (HCC) [A41.9]      Surgery:  Cystoscopy on 3/17/25      Patient Active Problem List   Diagnosis    Septic shock (HCC)    Diarrhea    Kidney stone    Ureterolithiasis    Acute kidney injury    Lactic acid acidosis    Elevated troponin level not due myocardial infarction    Transaminitis    Bandemia    Thrombocytopenia    Intermittent asthma with acute exacerbation    Glaucoma    Benign neoplasm of brain (HCC)    Trigeminal neuralgia    Acute renal insufficiency        ASSESSMENT of Current Deficits Patient exhibits decreased strength, balance, and endurance impairing functional mobility, transfers, gait , gait distance, and tolerance to activity. Patient with eyes closed all of session. Kept stating that she sees her  jeremias and hears his voice. Patient lawrence on not wanting to get up or be moved at all however was okay to perform some BUE and BLE exercise.         PHYSICAL THERAPY  PLAN OF CARE       Physical therapy plan of care is established based on physician order,  patient diagnosis and clinical assessment    Current Treatment Recommendations:    -Bed Mobility: Lower and 
Pulmonary/Critical Care Progress Note    We are following patient for likely gram-negative bacteremia, leukocytosis, urinary tract infection secondary to obstructing left ureteral stone status post removal and stent placement in the operating room yesterday, history of dementia, lactic acidosis (improved), agitation, ADORE, lactic acidosis (improving)    SUBJECTIVE:  The patient was somewhat agitated this morning and received IV Benadryl.  We have stopped the corticosteroids.  Her altered behavior is probably combination of age, dementia, steroids, bacteremia/sepsis and will hopefully clear once the source of infection is more completely treated.    There is a pending positive blood culture for gram-negative rods and 1 out of 4 bottles.  Therefore, we will continue cefepime.  However, her BUN and creatinine are climbing probably secondary to renal hypoperfusion and sepsis during the initial episode.  Therefore, we will discontinue enoxaparin and use heparin 5000 units twice per day.  We will continue dextrose 5% in lactated Ringer's and continue to try to wean her norepinephrine which is now I believe at 3 mcg/kg/min.  I would not feed her at this time since she is not alert awake or cooperative enough to responsibly take oral feedings or medications.    MEDICATIONS:   sodium chloride flush  5-40 mL IntraVENous 2 times per day    enoxaparin  30 mg SubCUTAneous Daily    gabapentin  300 mg Oral BID    latanoprost  1 drop Both Eyes Nightly    therapeutic multivitamin-minerals  1 tablet Oral Daily    cefepime  1,000 mg IntraVENous Q24H    pantoprazole (PROTONIX) 40 mg in sodium chloride (PF) 0.9 % 10 mL injection  40 mg IntraVENous Daily    midodrine  10 mg Oral BID WC      sodium chloride 10 mL/hr at 03/18/25 0038    dextrose 5% in lactated ringers 100 mL/hr at 03/18/25 0351    norepinephrine 7 mcg/min (03/18/25 0642)    dextrose       sulfur hexafluoride microspheres, sodium chloride flush, sodium chloride, potassium 
Pulmonary/Critical Care Progress Note    We are following patient for septic shock, E. coli bacteremia and urinary tract infection, obstructing left ureteral stone status post stent placement with stone still present, lactic acidosis, ADORE, leukocytosis (improved) resolving metabolic encephalopathy    SUBJECTIVE:  The patient has been improving from mental status and metabolic standpoint except for a persistent ADORE likely from an ischemic insult to her kidneys.  Nevertheless, her overall status is 1 of improvement.  She needs to be out of bed and her diet can be advanced if she is able to tolerate it.    For now, she is receiving D5 LR at 100 cc/h.  She has been off pressors since yesterday morning.  The patient does have a history of high blood pressure at home and is now hypertensive.  Unfortunately, she takes losartan 100 mg/day but with her current renal impairment, we will avoid ARB's and ACE inhibitors.  Therefore, we will substitute amlodipine 5 mg p.o. now and daily.  Midodrine can be discontinued PT and OT have been ordered.    Patient's platelet count has dropped to 44,000 which I suspect is secondary to bone marrow suppression from sepsis.  GI prophylaxis with Protonix is being given.    MEDICATIONS:   cefTRIAXone (ROCEPHIN) IV  2,000 mg IntraVENous Q24H    midodrine  10 mg Oral TID WC    sodium chloride flush  5-40 mL IntraVENous 2 times per day    [Held by provider] gabapentin  300 mg Oral BID    latanoprost  1 drop Both Eyes Nightly    therapeutic multivitamin-minerals  1 tablet Oral Daily    pantoprazole (PROTONIX) 40 mg in sodium chloride (PF) 0.9 % 10 mL injection  40 mg IntraVENous Daily      sodium chloride 20 mL/hr at 03/18/25 1634    dextrose 5% in lactated ringers 100 mL/hr at 03/20/25 0709    norepinephrine Stopped (03/19/25 0949)    dextrose       sulfur hexafluoride microspheres, sodium chloride flush, sodium chloride, potassium chloride **OR** potassium alternative oral replacement **OR** 
Pulmonary/Critical Care Progress Note    We are following patient for septic shock, urinary tract infection, E. coli bacteremia, obstructing left ureteral stone status post stent placement (stone is still present), lactic acidosis, (improving), leukocytosis, ADORE, resolving septic encephalopathy    SUBJECTIVE:  The patient is feeling better and is much more oriented awake and appropriately responsive.    She is currently receiving meropenem but until the E. coli organism sensitivities have been obtained, we will continue her on this medication.      The patient's BUN and creatinine have continued to increase probably related to prolonged hypotension and impairment of renal perfusion.  Nevertheless, she still continues to make urine and we will add several doses of albumin during the day (see below).    The patient is still pressor dependent and is on norepinephrine but it only 4 mcg/kg/min.  We will add midodrine at a higher dose from 10 twice daily to 15 mg p.o. 3 times daily.  She can be set at the bedside at least as long as her norepinephrine is being weaned appropriately.  We will add several doses of albumin in order to improve blood pressure and perfusion.  The patient continues to make urine although her BUN and creatinine have risen because of prolonged hypotension.    IV fluids will be continued.  Her diet can be started.  The echocardiogram done yesterday is essentially unremarkable.    MEDICATIONS:   meropenem  500 mg IntraVENous Q12H    sodium chloride flush  5-40 mL IntraVENous 2 times per day    [Held by provider] gabapentin  300 mg Oral BID    latanoprost  1 drop Both Eyes Nightly    [Held by provider] therapeutic multivitamin-minerals  1 tablet Oral Daily    pantoprazole (PROTONIX) 40 mg in sodium chloride (PF) 0.9 % 10 mL injection  40 mg IntraVENous Daily    midodrine  10 mg Oral BID       sodium chloride 20 mL/hr at 03/18/25 1634    dextrose 5% in lactated ringers 100 mL/hr at 03/19/25 0602    
Spiritual Health History and Assessment/Progress Note  [facility]    Spiritual/Emotional Needs,  ,  ,      Name: Dianne Narayan MRN: [id number]    Age: 89 y.o.     Sex: female   Language: English   Denominational: Presybeterian   Septic shock (HCC)     Date: 3/19/2025                           Spiritual Assessment continued in [facility] 2 ICU        Referral/Consult From: Rounding   Encounter Overview/Reason: Spiritual/Emotional Needs  Service Provided For: Family    Ashly, Belief, Meaning:   Patient is connected with a ashly tradition or spiritual practice and has beliefs or practices that help with coping during difficult times  Family/Friends are connected with a ashly tradition or spiritual practice and have beliefs or practices that help with coping during difficult times      Importance and Influence:  Patient has spiritual/personal beliefs that influence decisions regarding their health  Family/Friends have spiritual/personal beliefs that influence decisions regarding the patient's health    Community:  Patient feels well-supported. Support system includes: Ashly Community, Extended family, and Other: Patient information was supplied by family.  Family/Friends feel well-supported. Support system includes: Ashly Community and Extended family    Assessment and Plan of Care:       Family/Friends Interventions include: Facilitated expression of thoughts and feelings, Affirmed coping skills/support systems, Facilitated life review and/or legacy, and Other:  visited with family in ICU waiting room.  provided a listening presence, empathy, nurtured hope and  prayer.     Patient Plan of Care: Spiritual Care available upon further referral  Family/Friends Plan of Care: Spiritual Care available upon further referral    Electronically signed by Chaplain Stepan on 3/19/2025 at 2:25 PM    
Spiritual Health History and Assessment/Progress Note  [facility]    Attempted Encounter,  ,  ,      Name: Dianne Narayan MRN: [id number]    Age: 89 y.o.     Sex: female   Language: English   Zoroastrian: Latter day   Sepsis (HCC)     Date: 3/22/2025                           Spiritual Assessment continued in [facility] 2 ICU        Referral/Consult From: Rounding   Encounter Overview/Reason: Attempted Encounter  Service Provided For: Patient    Ashly, Belief, Meaning:   Patient unable to assess at this time  Family/Friends No family/friends present      Importance and Influence:  Patient unable to assess at this time  Family/Friends No family/friends present    Community:  Patient Other: Pt sleeping  Family/Friends No family/friends present    Assessment and Plan of Care:     Patient Interventions include: Other: Pt sleeping  Family/Friends Interventions include: No family/friends present    Patient Plan of Care: Spiritual Care available upon further referral  Family/Friends Plan of Care: No family/friends present    Electronically signed by Chaplain Osiris on 3/22/2025 at 11:13 AM   
Spiritual Health History and Assessment/Progress Note  [facility]    (P) Spiritual/Emotional Needs,  ,  ,      Name: Dianne Narayan MRN: [id number]    Age: 89 y.o.     Sex: female   Language: English   Restorationism: Latter-day   Sepsis (HCC)     Date: 3/18/2025                           Spiritual Assessment began in [facility] 2 ICU        Referral/Consult From: (P) Rounding   Encounter Overview/Reason: (P) Spiritual/Emotional Needs  Service Provided For: (P) Patient    Ashly, Belief, Meaning:   Patient identifies as spiritual  Family/Friends No family/friends present      Importance and Influence:  Patient has spiritual/personal beliefs that influence decisions regarding their health  Family/Friends No family/friends present    Community:  Patient feels well-supported. Support system includes: Spouse/Partner and Children  Family/Friends No family/friends present    Assessment and Plan of Care:     Patient Interventions include: Facilitated expression of thoughts and feelings and Other: prayer support provided  Family/Friends Interventions include: No family/friends present    Patient Plan of Care: Spiritual Care available upon further referral  Family/Friends Plan of Care: No family/friends present    Electronically signed by KARMA Chu on 3/18/2025 at 1:54 PM   
Spiritual Health History and Assessment/Progress Note  [facility]    (P) Follow-up,  ,  ,      Name: Dianne Narayan MRN: [id number]    Age: 89 y.o.     Sex: female   Language: English   Nondenominational: Episcopal   Sepsis (HCC)     Date: 3/24/2025                           Spiritual Assessment continued in [facility] 6S IMCU        Referral/Consult From: (P) Rounding   Encounter Overview/Reason: (P) Follow-up  Service Provided For: (P) Patient    Ashly, Belief, Meaning:   Patient is connected with a ashly tradition or spiritual practice  Family/Friends No family/friends present      Importance and Influence:  Patient has spiritual/personal beliefs that influence decisions regarding their health  Family/Friends No family/friends present    Community:  Patient is connected with a spiritual community  Family/Friends No family/friends present    Assessment and Plan of Care:     Patient Interventions include: Engaged in theological reflection  Family/Friends Interventions include: No family/friends present    Patient Plan of Care: Spiritual Care available upon further referral  Family/Friends Plan of Care: No family/friends present    Electronically signed by Chaplain Osiris on 3/24/2025 at 1:28 PM   
Spoke with Dr. Rodriguez about rash on face. 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 No need to isolate at this time.  
[facility]   IN-PATIENT SERVICE      Progress Note    3/22/2025    1:14 PM    Name:   Dianne Narayan  MRN:     [id number]     Acct:      [id number]   Room:   [address] Day:  5  Admit Date:  3/17/2025 12:33 PM    PCP:   Velasquez Marshall MD  Code Status:  Full Code    Subjective:     C/C:   Chief Complaint   Patient presents with    Fatigue     Started yesterday afternoon with diarrhea. Patient is hypotensive and hypoxic.     Diarrhea     Interval History Status: improved.     obstructing 8 mm proximal to mid left ureteral calculus.   S/p CYSTOSCOPY RETROGRADE PYELOGRAM LEFT STENT INSERTION   E. coli bacteremia-septic shock    Transfer out of icu 3/22    Pending SNF      Brief History:      A 89-year-old female with past medical history of hypertension, trigeminal neuralgia and peripheral neuropathy who presents from home with acute onset of left flank pain     Review of Systems:     Constitutional:  negative for chills, fevers, sweats  Respiratory:  negative for cough, dyspnea on exertion, shortness of breath, wheezing  Cardiovascular:  negative for chest pain, chest pressure/discomfort, lower extremity edema, palpitations  Gastrointestinal:  negative for abdominal pain, constipation, diarrhea, nausea, vomiting  Neurological:  negative for dizziness, headache    Medications:     Allergies:    Allergies   Allergen Reactions    Latex Rash    Penicillins Swelling    Asa [Aspirin] Nausea Only    Iodides     Lamictal [Lamotrigine]     Lincocin [Lincomycin Hcl]     Mydriacyl [Tropicamide]     Other      AK-DILATE  CIPRO TABLETS    Tegretol [Carbamazepine]     Torsemide     Achromycin [Tetracycline] Rash    Bactrim [Sulfamethoxazole-Trimethoprim] Rash    Ciprofloxacin Rash    Codeine Rash    Erythromycin Rash    Ketoconazole Rash    Motrin [Ibuprofen] Rash       Current Meds:   Scheduled Meds:    fondaparinux  2.5 mg SubCUTAneous Daily    [START ON 3/23/2025] pantoprazole  40 
35.3 32.2*   MCV 93.1 92.9 93.6   MCH 31.9 31.8 31.7   MCHC 34.3 34.3 33.9   RDW 15.9* 15.8* 15.5*   PLT  --   --  87*   MPV 11.0 10.8 11.4     Chemistry:  Recent Labs     03/21/25  0420 03/22/25  0442 03/23/25  0547    145 139   K 3.7 3.7 3.8   * 110* 105   CO2 25 28 26   GLUCOSE 133* 108* 87   BUN 61* 49* 42*   CREATININE 1.5* 1.2* 1.0   MG 2.1 1.7 1.7   ANIONGAP 9 7 8   LABGLOM 33* 45* 58*   CALCIUM 8.8 8.4* 8.0*   PHOS 2.3* 3.4 3.3     Recent Labs     03/21/25  0420   AST 26   ALT 53*   ALKPHOS 195*   BILITOT 0.6     ABG:No results found for: \"POCPH\", \"PHART\", \"PH\", \"POCPCO2\", \"TYG2MVT\", \"PCO2\", \"POCPO2\", \"PO2ART\", \"PO2\", \"POCHCO3\", \"JHO6IPW\", \"HCO3\", \"NBEA\", \"PBEA\", \"BEART\", \"BE\", \"THGBART\", \"THB\", \"ULV9ZXR\", \"IBUZ7UWU\", \"V3HDTPMV\", \"O2SAT\", \"FIO2\"  Lab Results   Component Value Date/Time    SPECIAL Site: Urine 03/17/2025 04:02 PM     Lab Results   Component Value Date/Time    CULTURE NO GROWTH 1 DAY 03/21/2025 05:15 PM    CULTURE NO GROWTH 1 DAY 03/21/2025 05:15 PM       Radiology:  XR CHEST PORTABLE  Result Date: 3/21/2025  1. There is interstitial prominence within the lungs concerning for mild pulmonary edema or fluid overload. 2. New small right pleural effusion.     XR CHEST PORTABLE  Result Date: 3/20/2025  There are persistent interstitial prominence within the lungs concerning for developing pulmonary edema or fluid overload.     Vascular duplex lower extremity venous bilateral  Result Date: 3/19/2025  No evidence of DVT in either lower extremity.     XR CHEST PORTABLE  Result Date: 3/19/2025  Stable appearance of the chest compared to 03/18/2025.     XR CHEST PORTABLE  Result Date: 3/18/2025  Pulmonary edema with small left and trace right pleural effusions.  Infection is not excluded in the appropriate clinical setting.     Fluoro For Surgical Procedures  Result Date: 3/17/2025  Intraprocedural fluoroscopic spot images as above.  See separate procedure report for more information.     CT 
sit: Minimal assist of 1    Sit to supine: Minimal assist of 1    Scooting: Minimal assist of 1     Transfers Sit to stand: Moderate assist of 1   Sit to stand: Minimal assist of 1    Ambulation     3-5 feet using  wheeled walker with Maximal progressing to moderate assist   for walker control, walker approximation, balance, upright, weight shift, multiplane instability, and safety    SEE ASSESSMENT    > 25 feet using  wheeled walker with Minimal assist of 1    ROM Within functional limits    Increase range of motion 10% of affected joints    Strength BUE:  refer to OT eval  RLE:  3-/5  LLE:   3-/5  Increase strength in affected mm groups by 1/3 grade   Balance Sitting EOB:  fair   Dynamic Standing:  poor + with WW  Sitting EOB:  fair+  Dynamic Standing: fair with WW     Patient is Alert & Oriented x person and place and follows one step directions moderately confused  Sensation:  Patient  denies numbness/tingling   Edema:  no   Endurance: fair -    Vitals:  2 liters high flow nasal cannula   Blood Pressure at rest  Blood Pressure during session  117/64   Heart Rate at rest  Heart Rate during session 74   SPO2 at rest %  SPO2 during session 94%     Patient education  Patient educated on role of Physical Therapy, risks of immobility, safety and plan of care, energy conservation,  importance of mobility while in hospital , ankle pumps, quad set and glut set for edema control, blood clot prevention, importance and purpose of adaptive device and adjusted to proper height for the patient., safety , and positioning for skin integrity and comfort     Patient response to education:   Pt verbalized understanding Pt demonstrated skill Pt requires further education in this area   Yes Partial Yes      Treatment:  Patient practiced and was instructed/facilitated in the following treatment: Patient Sat edge of bed 10 minutes with Supervision  to increase dynamic sitting balance and activity tolerance. Pt performed bed mobility, 
PORTABLE   Final Result   Stable appearance of the chest compared to 03/18/2025.         XR CHEST PORTABLE   Final Result   Pulmonary edema with small left and trace right pleural effusions.  Infection   is not excluded in the appropriate clinical setting.         Fluoro For Surgical Procedures   Final Result   Intraprocedural fluoroscopic spot images as above.  See separate procedure   report for more information.         CT HEAD WO CONTRAST   Final Result   There is no evidence of acute intracranial abnormality.      Obstructing 8 mm proximal to mid left ureteral calculus.      Descending and sigmoid colonic diverticulosis, without evidence of   diverticulitis.      Small amount of ascites in the pericolic gutters.      Cholelithiasis, without evidence of acute cholecystitis.         CT ABDOMEN PELVIS WO CONTRAST Additional Contrast? None   Final Result   There is no evidence of acute intracranial abnormality.      Obstructing 8 mm proximal to mid left ureteral calculus.      Descending and sigmoid colonic diverticulosis, without evidence of   diverticulitis.      Small amount of ascites in the pericolic gutters.      Cholelithiasis, without evidence of acute cholecystitis.         XR CHEST PORTABLE    (Results Pending)   XR CHEST PORTABLE    (Results Pending)           PROBLEM LIST:  Principal Problem:    Septic shock (HCC)  Active Problems:    Diarrhea    Kidney stone    Ureterolithiasis    Acute kidney injury    Lactic acid acidosis    Elevated troponin level not due myocardial infarction    Transaminitis    Bandemia    Thrombocytopenia    Intermittent asthma with acute exacerbation    Glaucoma    Benign neoplasm of brain (HCC)    Trigeminal neuralgia    Acute renal insufficiency  Resolved Problems:    * No resolved hospital problems. *      IMPRESSION:  ADORE, improving further  Septic shock, cleared  E. coli bacteremia  Coli UTI  Left ureteral stone status post stent placement  Encephalopathy, definitely 
1.0 0.6  --    ALKPHOS 197* 195*  --    AST 57* 26  --    ALT 67* 53*  --         Recent Labs     03/20/25  0402 03/21/25  0420   AST 57* 26   ALT 67* 53*          Radiology:  XR CHEST PORTABLE  Result Date: 3/21/2025  1. There is interstitial prominence within the lungs concerning for mild pulmonary edema or fluid overload. 2. New small right pleural effusion.     XR CHEST PORTABLE  Result Date: 3/20/2025  There are persistent interstitial prominence within the lungs concerning for developing pulmonary edema or fluid overload.     Vascular duplex lower extremity venous bilateral  Result Date: 3/19/2025  No evidence of DVT in either lower extremity.         Imaging and labs were reviewed per medical records.     Thank you for involving me in the care of Dianne Naraayn I will continue to follow. Please do not hesitate to call [phone] for any questions or concerns.    Electronically signed by Iva Rodriguez MD on 3/22/2025 at 8:45 AM     
prominence within the lungs concerning for developing pulmonary edema or fluid overload.     Vascular duplex lower extremity venous bilateral  Result Date: 3/19/2025  No evidence of DVT in either lower extremity.         Imaging and labs were reviewed per medical records.     Thank you for involving me in the care of Pauloarnav TORIBIO Narayan I will continue to follow. Please do not hesitate to call [phone] for any questions or concerns.    Electronically signed by Iva Rodriguez MD on 3/24/2025 at 12:48 PM

## 2025-04-15 RX ORDER — ACETAMINOPHEN 325 MG/1
650 TABLET ORAL EVERY 4 HOURS PRN
COMMUNITY

## 2025-04-15 RX ORDER — BISACODYL 10 MG
10 SUPPOSITORY, RECTAL RECTAL DAILY PRN
COMMUNITY

## 2025-04-15 NOTE — PROGRESS NOTES
[facility] PRE-ADMISSION TESTING INSTRUCTIONS  PROCEDURE TIME: 1210 PM            ARRIVAL TIME: 1000 AM  The Preadmission Testing patient is instructed accordingly using the following criteria (check applicable):    ARRIVAL INSTRUCTIONS:  [x] Parking the day of Surgery is located in the Main Entrance lot.  Upon entering the door, make an immediate right to the surgery reception desk    [x] Bring photo ID and insurance card    [] Bring in a copy of Living will or Durable Power of  papers.    [x] Please be sure to arrange for responsible adult to provide transportation to and from the hospital    [x] Please arrange for responsible adult to be with you for the 24 hour period post procedure due to having anesthesia    [x] If you awake am of surgery not feeling well or have temperature >100 please call [phone]    GENERAL INSTRUCTIONS:    [x] May have clear liquids until 4 hours prior to surgery. Examples include water, fruit juices (no pulp), jello, popsicles, black coffee or tea, beef or chicken broth.               No gum, candy or mints.    [x] You may brush your teeth, but do not swallow any water    [x] Take medications as instructed with 1-2 oz of water (GABAPENTIN, AMLODIPINE, PROTONIX)    [x] Stop herbal supplements and vitamins 5 days prior to procedure    [] Follow preop dosing of blood thinners per physician instructions    [] Take 1/2 dose of evening insulin, but no insulin after midnight    [] No oral diabetic medications after midnight    [] If diabetic and have low blood sugar or feel symptomatic, take 1-2oz apple juice only    [] Bring inhalers day of surgery    [] Bring C-PAP/ Bi-Pap day of surgery    [] Bring urine specimen day of surgery    [x] Shower or bath with soap, lather and rinse well, AM of Surgery, no lotion, powders or creams to surgical site    [] Follow bowel prep as instructed per surgeon    [x] No tobacco products within 24 hours of surgery     [x]

## 2025-04-15 NOTE — PROGRESS NOTES
Patient is a resident at [facility] telephone assessment completed with LAURIE SIERRA.      A&O:  ALERT AND ORIENTED X3  Code Status:  FULL CODE  Ambulatory:  WHEELCHAIR  Oxygen:  3 L NASAL CANNULA CONTINUOUS  Hard of Hearing:  NO  Call light:  CAN USE CALL LIGHT   Signs Documents?  SIGNS FOR SELF  POA:  N/A  Transport:  COMFORT CARAVAN WITH FAMILY  Wounds:  NONE  Isolation:  NONE      Verified arrival time of 1000 with facility.  General instructions and medication instructions faxed to facility.

## 2025-04-17 ENCOUNTER — ANESTHESIA EVENT (OUTPATIENT)
Dept: OPERATING ROOM | Age: 89
End: 2025-04-17
Payer: MEDICARE

## 2025-04-17 ENCOUNTER — PREP FOR PROCEDURE (OUTPATIENT)
Dept: UROLOGY | Age: 89
End: 2025-04-17

## 2025-04-17 RX ORDER — SODIUM CHLORIDE 9 MG/ML
INJECTION, SOLUTION INTRAVENOUS PRN
Status: CANCELLED | OUTPATIENT
Start: 2025-04-17

## 2025-04-17 RX ORDER — SODIUM CHLORIDE 9 MG/ML
INJECTION, SOLUTION INTRAVENOUS CONTINUOUS
Status: CANCELLED | OUTPATIENT
Start: 2025-04-17

## 2025-04-17 RX ORDER — SODIUM CHLORIDE 0.9 % (FLUSH) 0.9 %
5-40 SYRINGE (ML) INJECTION EVERY 12 HOURS SCHEDULED
Status: CANCELLED | OUTPATIENT
Start: 2025-04-17

## 2025-04-17 RX ORDER — SODIUM CHLORIDE 0.9 % (FLUSH) 0.9 %
5-40 SYRINGE (ML) INJECTION PRN
Status: CANCELLED | OUTPATIENT
Start: 2025-04-17

## 2025-04-18 ENCOUNTER — HOSPITAL ENCOUNTER (OUTPATIENT)
Age: 89
Setting detail: OUTPATIENT SURGERY
Discharge: HOME | End: 2025-04-18
Attending: UROLOGY | Admitting: UROLOGY
Payer: MEDICARE

## 2025-04-18 ENCOUNTER — ANESTHESIA (OUTPATIENT)
Dept: OPERATING ROOM | Age: 89
End: 2025-04-18
Payer: MEDICARE

## 2025-04-18 ENCOUNTER — HOSPITAL ENCOUNTER (OUTPATIENT)
Dept: GENERAL RADIOLOGY | Age: 89
Setting detail: OUTPATIENT SURGERY
Discharge: HOME | End: 2025-04-20
Attending: UROLOGY
Payer: MEDICARE

## 2025-04-18 VITALS
DIASTOLIC BLOOD PRESSURE: 80 MMHG | SYSTOLIC BLOOD PRESSURE: 142 MMHG | HEIGHT: 67 IN | HEART RATE: 72 BPM | BODY MASS INDEX: 27.31 KG/M2 | WEIGHT: 174 LBS | RESPIRATION RATE: 18 BRPM | TEMPERATURE: 98 F | OXYGEN SATURATION: 95 %

## 2025-04-18 DIAGNOSIS — R52 PAIN: ICD-10-CM

## 2025-04-18 PROCEDURE — 3700000000 HC ANESTHESIA ATTENDED CARE: Performed by: UROLOGY

## 2025-04-18 PROCEDURE — 74420 UROGRAPHY RTRGR +-KUB: CPT

## 2025-04-18 PROCEDURE — 7100000001 HC PACU RECOVERY - ADDTL 15 MIN: Performed by: UROLOGY

## 2025-04-18 PROCEDURE — 3600000003 HC SURGERY LEVEL 3 BASE: Performed by: UROLOGY

## 2025-04-18 PROCEDURE — 2580000003 HC RX 258: Performed by: NURSE PRACTITIONER

## 2025-04-18 PROCEDURE — C2617 STENT, NON-COR, TEM W/O DEL: HCPCS | Performed by: UROLOGY

## 2025-04-18 PROCEDURE — 7100000010 HC PHASE II RECOVERY - FIRST 15 MIN: Performed by: UROLOGY

## 2025-04-18 PROCEDURE — 6360000004 HC RX CONTRAST MEDICATION: Performed by: UROLOGY

## 2025-04-18 PROCEDURE — C1894 INTRO/SHEATH, NON-LASER: HCPCS | Performed by: UROLOGY

## 2025-04-18 PROCEDURE — 3600000013 HC SURGERY LEVEL 3 ADDTL 15MIN: Performed by: UROLOGY

## 2025-04-18 PROCEDURE — 7100000011 HC PHASE II RECOVERY - ADDTL 15 MIN: Performed by: UROLOGY

## 2025-04-18 PROCEDURE — 2720000010 HC SURG SUPPLY STERILE: Performed by: UROLOGY

## 2025-04-18 PROCEDURE — C1769 GUIDE WIRE: HCPCS | Performed by: UROLOGY

## 2025-04-18 PROCEDURE — 2709999900 HC NON-CHARGEABLE SUPPLY: Performed by: UROLOGY

## 2025-04-18 PROCEDURE — 7100000000 HC PACU RECOVERY - FIRST 15 MIN: Performed by: UROLOGY

## 2025-04-18 PROCEDURE — 3700000001 HC ADD 15 MINUTES (ANESTHESIA): Performed by: UROLOGY

## 2025-04-18 PROCEDURE — 6360000002 HC RX W HCPCS

## 2025-04-18 DEVICE — URETERAL STENT
Type: IMPLANTABLE DEVICE | Site: URETER | Status: FUNCTIONAL
Brand: PERCUFLEX™

## 2025-04-18 RX ORDER — ONDANSETRON 2 MG/ML
4 INJECTION INTRAMUSCULAR; INTRAVENOUS
Status: DISCONTINUED | OUTPATIENT
Start: 2025-04-18 | End: 2025-04-18 | Stop reason: HOSPADM

## 2025-04-18 RX ORDER — FENTANYL CITRATE 50 UG/ML
INJECTION, SOLUTION INTRAMUSCULAR; INTRAVENOUS
Status: DISCONTINUED | OUTPATIENT
Start: 2025-04-18 | End: 2025-04-18 | Stop reason: SDUPTHER

## 2025-04-18 RX ORDER — NITROFURANTOIN MACROCRYSTALS 100 MG/1
100 CAPSULE ORAL 2 TIMES DAILY
Qty: 10 CAPSULE | Refills: 0 | Status: SHIPPED | OUTPATIENT
Start: 2025-04-18 | End: 2025-04-23

## 2025-04-18 RX ORDER — SODIUM CHLORIDE 0.9 % (FLUSH) 0.9 %
5-40 SYRINGE (ML) INJECTION EVERY 12 HOURS SCHEDULED
Status: DISCONTINUED | OUTPATIENT
Start: 2025-04-18 | End: 2025-04-18 | Stop reason: HOSPADM

## 2025-04-18 RX ORDER — CEFAZOLIN SODIUM 1 G/3ML
INJECTION, POWDER, FOR SOLUTION INTRAMUSCULAR; INTRAVENOUS
Status: DISCONTINUED | OUTPATIENT
Start: 2025-04-18 | End: 2025-04-18 | Stop reason: SDUPTHER

## 2025-04-18 RX ORDER — NALOXONE HYDROCHLORIDE 0.4 MG/ML
INJECTION, SOLUTION INTRAMUSCULAR; INTRAVENOUS; SUBCUTANEOUS PRN
Status: DISCONTINUED | OUTPATIENT
Start: 2025-04-18 | End: 2025-04-18 | Stop reason: HOSPADM

## 2025-04-18 RX ORDER — SODIUM CHLORIDE 9 MG/ML
INJECTION, SOLUTION INTRAVENOUS CONTINUOUS
Status: DISCONTINUED | OUTPATIENT
Start: 2025-04-18 | End: 2025-04-18 | Stop reason: HOSPADM

## 2025-04-18 RX ORDER — IOPAMIDOL 612 MG/ML
INJECTION, SOLUTION INTRAVASCULAR PRN
Status: DISCONTINUED | OUTPATIENT
Start: 2025-04-18 | End: 2025-04-18 | Stop reason: ALTCHOICE

## 2025-04-18 RX ORDER — SODIUM CHLORIDE 9 MG/ML
INJECTION, SOLUTION INTRAVENOUS PRN
Status: DISCONTINUED | OUTPATIENT
Start: 2025-04-18 | End: 2025-04-18 | Stop reason: HOSPADM

## 2025-04-18 RX ORDER — PROPOFOL 10 MG/ML
INJECTION, EMULSION INTRAVENOUS
Status: DISCONTINUED | OUTPATIENT
Start: 2025-04-18 | End: 2025-04-18 | Stop reason: SDUPTHER

## 2025-04-18 RX ORDER — SODIUM CHLORIDE 0.9 % (FLUSH) 0.9 %
5-40 SYRINGE (ML) INJECTION PRN
Status: DISCONTINUED | OUTPATIENT
Start: 2025-04-18 | End: 2025-04-18 | Stop reason: HOSPADM

## 2025-04-18 RX ORDER — FENTANYL CITRATE 50 UG/ML
50 INJECTION, SOLUTION INTRAMUSCULAR; INTRAVENOUS EVERY 5 MIN PRN
Status: DISCONTINUED | OUTPATIENT
Start: 2025-04-18 | End: 2025-04-18 | Stop reason: HOSPADM

## 2025-04-18 RX ORDER — FENTANYL CITRATE 50 UG/ML
25 INJECTION, SOLUTION INTRAMUSCULAR; INTRAVENOUS EVERY 5 MIN PRN
Status: DISCONTINUED | OUTPATIENT
Start: 2025-04-18 | End: 2025-04-18 | Stop reason: HOSPADM

## 2025-04-18 RX ADMIN — PROPOFOL 30 MG: 10 INJECTION, EMULSION INTRAVENOUS at 15:12

## 2025-04-18 RX ADMIN — FENTANYL CITRATE 75 MCG: 50 INJECTION, SOLUTION INTRAMUSCULAR; INTRAVENOUS at 14:42

## 2025-04-18 RX ADMIN — SODIUM CHLORIDE: 9 INJECTION, SOLUTION INTRAVENOUS at 14:20

## 2025-04-18 RX ADMIN — PROPOFOL 30 MG: 10 INJECTION, EMULSION INTRAVENOUS at 14:38

## 2025-04-18 RX ADMIN — FENTANYL CITRATE 25 MCG: 50 INJECTION, SOLUTION INTRAMUSCULAR; INTRAVENOUS at 15:08

## 2025-04-18 RX ADMIN — PROPOFOL 50 MCG/KG/MIN: 10 INJECTION, EMULSION INTRAVENOUS at 14:39

## 2025-04-18 RX ADMIN — CEFAZOLIN 2 G: 1 INJECTION, POWDER, FOR SOLUTION INTRAMUSCULAR; INTRAVENOUS at 14:47

## 2025-04-18 ASSESSMENT — PAIN DESCRIPTION - DESCRIPTORS
DESCRIPTORS: ACHING;DISCOMFORT
DESCRIPTORS: ACHING;CRAMPING
DESCRIPTORS: ACHING;BURNING

## 2025-04-18 ASSESSMENT — PAIN DESCRIPTION - PAIN TYPE
TYPE: SURGICAL PAIN

## 2025-04-18 ASSESSMENT — PAIN - FUNCTIONAL ASSESSMENT: PAIN_FUNCTIONAL_ASSESSMENT: 0-10

## 2025-04-18 ASSESSMENT — PAIN SCALES - GENERAL
PAINLEVEL_OUTOF10: 7

## 2025-04-18 ASSESSMENT — PAIN DESCRIPTION - LOCATION: LOCATION: VAGINA

## 2025-04-18 NOTE — ANESTHESIA POSTPROCEDURE EVALUATION
Department of Anesthesiology  Postprocedure Note    Patient: Dianne Narayan  MRN: [id number]  YOB: 1935  Date of evaluation: 4/18/2025    Procedure Summary       Date: 04/18/25 Room / Location: [address]    Anesthesia Start: 1428 Anesthesia Stop: 1540    Procedure: CYSTOSCOPY RETROGRADE PYELOGRAM URETEROSCOPY STRING  STENT ATTACHED TO MELGOZA LASER LITHOTRIPSY LEFT         ++FACILITY++         ++LATEX ALLERGY++         ++IODINE ALLERGY++ (Left) Diagnosis:       Calculus of ureter      (Calculus of ureter [N20.1])    Surgeons: Daljit Gomez MD Responsible Provider: Wyatt Gifford DO    Anesthesia Type: MAC ASA Status: 3            Anesthesia Type: MAC    Berna Phase I: Berna Score: 10    Berna Phase II:      Anesthesia Post Evaluation    Patient location during evaluation: bedside  Patient participation: complete - patient participated  Level of consciousness: awake and alert  Pain score: 0  Airway patency: patent  Nausea & Vomiting: no nausea and no vomiting  Cardiovascular status: hemodynamically stable  Respiratory status: acceptable and room air  Hydration status: stable  Pain management: adequate        No notable events documented.

## 2025-04-18 NOTE — DISCHARGE INSTRUCTIONS
[facility]  [address]  [place] [id number]  [phone]  FAX [phone]      Discharge instructions for Dr. Daljit Gomez     General:   -You will be groggy throughout the day due to the effects of anesthesia.  Have a responsible adult monitor you for the next 24 hours.  -Call if fever or chills  -It is very normal to have burning and pain.  If the pain is severe and out of your control contact Dr. Gomez.    Diet: You may eat or drink whatever you like today.  You may experience some nausea.  Call if you have vomiting or inability to tolerate food or drink.    Urine: Expect blood in your urine.  This is normal.  This may be very traumatic in appearance but is not concerning unless there are large amount of clots that prevent you from being able to urinate.  If you have any questions or concerns contact Dr. Gomez's office.    Driving: You may not drive for 24 hours.  You also may not drive if you are taking narcotic pain medications.    Activity: There are no restrictions on your activity.    MESSAGE FOR NURSING FACILITY:  The patient has a Syed catheter and this may not be removed or exchanged until next Friday, April 25.  Attached to the catheter is a stent that is placed into her ureter and is needed to stay in place for 1 week.  Next Friday morning simply remove the catheter and the stent will come with it.  This will not cause pain and can be done very easily.

## 2025-04-18 NOTE — H&P
Dianne Narayan is a 89 y.o. female with a left ureteral stone and recent sepsis.      Past Medical History:   Diagnosis Date    Asthma     Brain tumor (benign) (HCC)     colloid cyst    GERD (gastroesophageal reflux disease)     Glaucoma     Hypertension     Kidney stone     Thrombocytopenia     Trigeminal neuralgia        Past Surgical History:   Procedure Laterality Date    APPENDECTOMY      DEBRIDEMENT Left 3/17/2025    CYSTOSCOPY RETROGRADE PYELOGRAM LEFT STENT INSERTION performed by Daljit Gomez MD at [facility] OR    HYSTERECTOMY (CERVIX STATUS UNKNOWN)      JOINT REPLACEMENT      OTHER SURGICAL HISTORY      balloon compression for trigeminal neuralgia x 4    TONSILLECTOMY         History reviewed. No pertinent family history.    Prior to Admission medications    Medication Sig Start Date End Date Taking? Authorizing Provider   acetaminophen (TYLENOL) 325 MG tablet Take 2 tablets by mouth every 4 hours as needed for Pain   Yes Martina Salgado MD   bisacodyl (DULCOLAX) 10 MG suppository Place 1 suppository rectally daily as needed for Constipation   Yes Martina Salgado MD   melatonin 5 MG TABS tablet Take 1 tablet by mouth nightly   Yes Martina Salgado MD   magnesium hydroxide (MILK OF MAGNESIA) 400 MG/5ML suspension Take 30 mLs by mouth daily as needed for Constipation   Yes Martina Salgado MD   amLODIPine (NORVASC) 10 MG tablet Take 1 tablet by mouth daily 3/25/25  Yes Weston Torres MD   pantoprazole (PROTONIX) 40 MG tablet Take 1 tablet by mouth every morning (before breakfast) 3/26/25  Yes Weston Torres MD   polyethylene glycol (GLYCOLAX) 17 g packet Take 1 packet by mouth daily as needed for Constipation 3/25/25 4/24/25 Yes Weston Torres MD   gabapentin (NEURONTIN) 300 MG capsule Take 1 capsule by mouth 2 times daily.   Yes Martina Salgado MD   latanoprost (XALATAN) 0.005 % ophthalmic solution Place 1 drop into both eyes nightly   Yes Martina Salgado

## 2025-04-18 NOTE — ANESTHESIA PRE PROCEDURE
antiemetics administered.  Anesthetic plan and risks discussed with patient.    Use of blood products discussed with patient whom consented to blood products.    Plan discussed with CRNA and attending.    Attending anesthesiologist reviewed and agrees with Preprocedure content                Colten Wetzel RN   4/18/2025    Wyatt Gifford DO  Staff Anesthesiologist  April 18, 2025  3:20 PM

## 2025-04-18 NOTE — OP NOTE
Operative Note      Patient: Dianne Narayan  YOB: 1935  MRN: [id number]    Date of Procedure: 4/18/2025    Pre-Op Diagnosis Codes:      * Calculus of ureter [N20.1], large proximal stone with history of sepsis    Post-Op Diagnosis: Same       Procedure(s):  CYSTOSCOPY RETROGRADE PYELOGRAM URETEROSCOPY STRING  STENT ATTACHED TO MELGOZA LASER LITHOTRIPSY LEFT         ++FACILITY++         ++LATEX ALLERGY++         ++IODINE ALLERGY++    Surgeon(s):  Daljit Gomez MD    Assistant:   * No surgical staff found *    Anesthesia: Monitor Anesthesia Care    Estimated Blood Loss (mL): Minimal    Complications: None    Specimens:   * No specimens in log *    Implants:  Implant Name Type Inv. Item Serial No.  Lot No. LRB No. Used Action   STENT URET L26CM OD48FR PERCFLX DBL PGTL FLX TIP THRD W O - [id number]  STENT URET L26CM OD48FR PERCFLX DBL PGTL FLX TIP THRD W O  [employer] [facility] [id number] Left 1 Implanted         Drains:   Ureteral Drain/Stent 04/18/25 (Active)       Urinary Catheter 04/18/25 (Active)       [REMOVED] Ureteral Drain/Stent 03/17/25 Left Ureter (Removed)       [REMOVED] Urinary Catheter 03/17/25 Melgoza (Removed)   $ Urethral catheter insertion Inserted for procedure 03/23/25 2030   Catheter Indications Perioperative use for selected surgical procedures 03/25/25 2000   Site Assessment No urethral drainage 03/25/25 2000   Urine Color Pink;Jasvir 03/26/25 0522   Urine Appearance Clear 03/26/25 0522   Collection Container Standard 03/25/25 2325   Securement Method Securing device (Describe) 03/25/25 2325   Catheter Care  Soap and water 03/25/25 2325   Catheter Best Practices  Drainage tube clipped to bed;Bag below bladder;Bag not on floor;Lack of dependent loop in tubing;Drainage bag less than half full 03/25/25 2325   Status Patent;Draining 03/25/25 2000   Manual Irrigation Volume Input (mL) 100 mL 03/25/25 2325   Output (mL) 650 mL 03/26/25 1030         INDICATIONS FOR

## 2025-05-30 ENCOUNTER — APPOINTMENT (OUTPATIENT)
Dept: ULTRASOUND IMAGING | Age: 89
End: 2025-05-30
Payer: MEDICARE

## 2025-05-30 ENCOUNTER — APPOINTMENT (OUTPATIENT)
Dept: CT IMAGING | Age: 89
End: 2025-05-30
Payer: MEDICARE

## 2025-05-30 ENCOUNTER — HOSPITAL ENCOUNTER (OUTPATIENT)
Age: 89
Setting detail: OBSERVATION
Discharge: HOME | End: 2025-05-31
Attending: STUDENT IN AN ORGANIZED HEALTH CARE EDUCATION/TRAINING PROGRAM | Admitting: INTERNAL MEDICINE
Payer: MEDICARE

## 2025-05-30 DIAGNOSIS — G45.9 TIA (TRANSIENT ISCHEMIC ATTACK): Primary | ICD-10-CM

## 2025-05-30 LAB
ALBUMIN SERPL-MCNC: 4.3 G/DL (ref 3.5–5.2)
ALP SERPL-CCNC: 87 U/L (ref 35–104)
ALT SERPL-CCNC: 10 U/L (ref 0–32)
ANION GAP SERPL CALCULATED.3IONS-SCNC: 9 MMOL/L (ref 7–16)
AST SERPL-CCNC: 23 U/L (ref 0–31)
BASOPHILS # BLD: 0.07 K/UL (ref 0–0.2)
BASOPHILS NFR BLD: 1 % (ref 0–2)
BILIRUB SERPL-MCNC: 0.4 MG/DL (ref 0–1.2)
BUN SERPL-MCNC: 22 MG/DL (ref 6–23)
CALCIUM SERPL-MCNC: 10.1 MG/DL (ref 8.6–10.2)
CHLORIDE SERPL-SCNC: 104 MMOL/L (ref 98–107)
CO2 SERPL-SCNC: 29 MMOL/L (ref 22–29)
CREAT SERPL-MCNC: 0.8 MG/DL (ref 0.5–1)
EOSINOPHIL # BLD: 0.16 K/UL (ref 0.05–0.5)
EOSINOPHILS RELATIVE PERCENT: 2 % (ref 0–6)
ERYTHROCYTE [DISTWIDTH] IN BLOOD BY AUTOMATED COUNT: 15.4 % (ref 11.5–15)
GFR, ESTIMATED: 76 ML/MIN/1.73M2
GLUCOSE BLD-MCNC: 104 MG/DL (ref 74–99)
GLUCOSE SERPL-MCNC: 85 MG/DL (ref 74–99)
HCT VFR BLD AUTO: 41.2 % (ref 34–48)
HGB BLD-MCNC: 13.5 G/DL (ref 11.5–15.5)
IMM GRANULOCYTES # BLD AUTO: <0.03 K/UL (ref 0–0.58)
IMM GRANULOCYTES NFR BLD: 0 % (ref 0–5)
INR PPP: 1
LYMPHOCYTES NFR BLD: 2.02 K/UL (ref 1.5–4)
LYMPHOCYTES RELATIVE PERCENT: 21 % (ref 20–42)
MAGNESIUM SERPL-MCNC: 2.4 MG/DL (ref 1.6–2.6)
MCH RBC QN AUTO: 31 PG (ref 26–35)
MCHC RBC AUTO-ENTMCNC: 32.8 G/DL (ref 32–34.5)
MCV RBC AUTO: 94.7 FL (ref 80–99.9)
MONOCYTES NFR BLD: 1.03 K/UL (ref 0.1–0.95)
MONOCYTES NFR BLD: 11 % (ref 2–12)
NEUTROPHILS NFR BLD: 66 % (ref 43–80)
NEUTS SEG NFR BLD: 6.35 K/UL (ref 1.8–7.3)
PARTIAL THROMBOPLASTIN TIME: 33.8 SEC (ref 24.5–35.1)
PLATELET # BLD AUTO: 283 K/UL (ref 130–450)
PMV BLD AUTO: 9.9 FL (ref 7–12)
POTASSIUM SERPL-SCNC: 4.8 MMOL/L (ref 3.5–5)
PROT SERPL-MCNC: 7.1 G/DL (ref 6.4–8.3)
PROTHROMBIN TIME: 10.7 SEC (ref 9.3–12.4)
RBC # BLD AUTO: 4.35 M/UL (ref 3.5–5.5)
SODIUM SERPL-SCNC: 142 MMOL/L (ref 132–146)
TROPONIN I SERPL HS-MCNC: 14 NG/L (ref 0–14)
WBC OTHER # BLD: 9.7 K/UL (ref 4.5–11.5)

## 2025-05-30 PROCEDURE — 6370000000 HC RX 637 (ALT 250 FOR IP): Performed by: INTERNAL MEDICINE

## 2025-05-30 PROCEDURE — 84484 ASSAY OF TROPONIN QUANT: CPT

## 2025-05-30 PROCEDURE — G0378 HOSPITAL OBSERVATION PER HR: HCPCS

## 2025-05-30 PROCEDURE — 85025 COMPLETE CBC W/AUTO DIFF WBC: CPT

## 2025-05-30 PROCEDURE — 83735 ASSAY OF MAGNESIUM: CPT

## 2025-05-30 PROCEDURE — 70450 CT HEAD/BRAIN W/O DYE: CPT

## 2025-05-30 PROCEDURE — 6370000000 HC RX 637 (ALT 250 FOR IP): Performed by: STUDENT IN AN ORGANIZED HEALTH CARE EDUCATION/TRAINING PROGRAM

## 2025-05-30 PROCEDURE — 82962 GLUCOSE BLOOD TEST: CPT

## 2025-05-30 PROCEDURE — 83036 HEMOGLOBIN GLYCOSYLATED A1C: CPT

## 2025-05-30 PROCEDURE — 93880 EXTRACRANIAL BILAT STUDY: CPT

## 2025-05-30 PROCEDURE — 85730 THROMBOPLASTIN TIME PARTIAL: CPT

## 2025-05-30 PROCEDURE — 85610 PROTHROMBIN TIME: CPT

## 2025-05-30 PROCEDURE — 99222 1ST HOSP IP/OBS MODERATE 55: CPT | Performed by: INTERNAL MEDICINE

## 2025-05-30 PROCEDURE — 99285 EMERGENCY DEPT VISIT HI MDM: CPT

## 2025-05-30 PROCEDURE — 80053 COMPREHEN METABOLIC PANEL: CPT

## 2025-05-30 PROCEDURE — 93005 ELECTROCARDIOGRAM TRACING: CPT | Performed by: STUDENT IN AN ORGANIZED HEALTH CARE EDUCATION/TRAINING PROGRAM

## 2025-05-30 RX ORDER — POTASSIUM CHLORIDE 1500 MG/1
40 TABLET, EXTENDED RELEASE ORAL PRN
Status: DISCONTINUED | OUTPATIENT
Start: 2025-05-30 | End: 2025-05-31 | Stop reason: HOSPADM

## 2025-05-30 RX ORDER — MAGNESIUM SULFATE IN WATER 40 MG/ML
2000 INJECTION, SOLUTION INTRAVENOUS PRN
Status: DISCONTINUED | OUTPATIENT
Start: 2025-05-30 | End: 2025-05-31 | Stop reason: HOSPADM

## 2025-05-30 RX ORDER — CLOPIDOGREL 300 MG/1
300 TABLET, FILM COATED ORAL ONCE
Status: COMPLETED | OUTPATIENT
Start: 2025-05-30 | End: 2025-05-30

## 2025-05-30 RX ORDER — ACETAMINOPHEN 325 MG/1
650 TABLET ORAL EVERY 6 HOURS PRN
Status: DISCONTINUED | OUTPATIENT
Start: 2025-05-30 | End: 2025-05-31 | Stop reason: HOSPADM

## 2025-05-30 RX ORDER — ONDANSETRON 2 MG/ML
4 INJECTION INTRAMUSCULAR; INTRAVENOUS EVERY 6 HOURS PRN
Status: DISCONTINUED | OUTPATIENT
Start: 2025-05-30 | End: 2025-05-31 | Stop reason: HOSPADM

## 2025-05-30 RX ORDER — SODIUM CHLORIDE 0.9 % (FLUSH) 0.9 %
5-40 SYRINGE (ML) INJECTION EVERY 12 HOURS SCHEDULED
Status: DISCONTINUED | OUTPATIENT
Start: 2025-05-30 | End: 2025-05-31 | Stop reason: HOSPADM

## 2025-05-30 RX ORDER — ENOXAPARIN SODIUM 100 MG/ML
40 INJECTION SUBCUTANEOUS DAILY
Status: DISCONTINUED | OUTPATIENT
Start: 2025-05-31 | End: 2025-05-31 | Stop reason: HOSPADM

## 2025-05-30 RX ORDER — PANTOPRAZOLE SODIUM 40 MG/1
40 TABLET, DELAYED RELEASE ORAL
Status: DISCONTINUED | OUTPATIENT
Start: 2025-05-31 | End: 2025-05-31 | Stop reason: HOSPADM

## 2025-05-30 RX ORDER — SODIUM CHLORIDE 0.9 % (FLUSH) 0.9 %
5-40 SYRINGE (ML) INJECTION PRN
Status: DISCONTINUED | OUTPATIENT
Start: 2025-05-30 | End: 2025-05-31 | Stop reason: HOSPADM

## 2025-05-30 RX ORDER — POLYETHYLENE GLYCOL 3350 17 G/17G
17 POWDER, FOR SOLUTION ORAL DAILY PRN
Status: DISCONTINUED | OUTPATIENT
Start: 2025-05-30 | End: 2025-05-31 | Stop reason: HOSPADM

## 2025-05-30 RX ORDER — LATANOPROST 50 UG/ML
1 SOLUTION/ DROPS OPHTHALMIC NIGHTLY
Status: DISCONTINUED | OUTPATIENT
Start: 2025-05-30 | End: 2025-05-31 | Stop reason: HOSPADM

## 2025-05-30 RX ORDER — ONDANSETRON 4 MG/1
4 TABLET, ORALLY DISINTEGRATING ORAL EVERY 8 HOURS PRN
Status: DISCONTINUED | OUTPATIENT
Start: 2025-05-30 | End: 2025-05-31 | Stop reason: HOSPADM

## 2025-05-30 RX ORDER — SODIUM CHLORIDE 9 MG/ML
INJECTION, SOLUTION INTRAVENOUS PRN
Status: DISCONTINUED | OUTPATIENT
Start: 2025-05-30 | End: 2025-05-31 | Stop reason: HOSPADM

## 2025-05-30 RX ORDER — BISACODYL 10 MG
10 SUPPOSITORY, RECTAL RECTAL DAILY PRN
Status: DISCONTINUED | OUTPATIENT
Start: 2025-05-30 | End: 2025-05-31 | Stop reason: HOSPADM

## 2025-05-30 RX ORDER — GABAPENTIN 300 MG/1
300 CAPSULE ORAL 2 TIMES DAILY
Status: DISCONTINUED | OUTPATIENT
Start: 2025-05-30 | End: 2025-05-31 | Stop reason: HOSPADM

## 2025-05-30 RX ORDER — ACETAMINOPHEN 650 MG/1
650 SUPPOSITORY RECTAL EVERY 6 HOURS PRN
Status: DISCONTINUED | OUTPATIENT
Start: 2025-05-30 | End: 2025-05-31 | Stop reason: HOSPADM

## 2025-05-30 RX ORDER — POTASSIUM CHLORIDE 7.45 MG/ML
10 INJECTION INTRAVENOUS PRN
Status: DISCONTINUED | OUTPATIENT
Start: 2025-05-30 | End: 2025-05-31 | Stop reason: HOSPADM

## 2025-05-30 RX ADMIN — GABAPENTIN 300 MG: 300 CAPSULE ORAL at 22:05

## 2025-05-30 RX ADMIN — CLOPIDOGREL BISULFATE 300 MG: 300 TABLET, FILM COATED ORAL at 22:05

## 2025-05-30 RX ADMIN — LATANOPROST 1 DROP: 50 SOLUTION OPHTHALMIC at 23:15

## 2025-05-31 VITALS
HEART RATE: 76 BPM | HEIGHT: 67 IN | DIASTOLIC BLOOD PRESSURE: 75 MMHG | TEMPERATURE: 98.4 F | OXYGEN SATURATION: 95 % | WEIGHT: 157.2 LBS | RESPIRATION RATE: 18 BRPM | BODY MASS INDEX: 24.67 KG/M2 | SYSTOLIC BLOOD PRESSURE: 172 MMHG

## 2025-05-31 LAB
ANION GAP SERPL CALCULATED.3IONS-SCNC: 11 MMOL/L (ref 7–16)
BASOPHILS # BLD: 0.07 K/UL (ref 0–0.2)
BASOPHILS NFR BLD: 1 % (ref 0–2)
BUN SERPL-MCNC: 20 MG/DL (ref 6–23)
CALCIUM SERPL-MCNC: 9.3 MG/DL (ref 8.6–10.2)
CHLORIDE SERPL-SCNC: 105 MMOL/L (ref 98–107)
CO2 SERPL-SCNC: 24 MMOL/L (ref 22–29)
CREAT SERPL-MCNC: 0.6 MG/DL (ref 0.5–1)
EOSINOPHIL # BLD: 0.22 K/UL (ref 0.05–0.5)
EOSINOPHILS RELATIVE PERCENT: 3 % (ref 0–6)
ERYTHROCYTE [DISTWIDTH] IN BLOOD BY AUTOMATED COUNT: 15.3 % (ref 11.5–15)
FOLATE SERPL-MCNC: 23.8 NG/ML (ref 4.6–34.8)
GFR, ESTIMATED: 86 ML/MIN/1.73M2
GLUCOSE SERPL-MCNC: 84 MG/DL (ref 74–99)
HBA1C MFR BLD: 5.2 % (ref 4–5.6)
HCT VFR BLD AUTO: 39.9 % (ref 34–48)
HGB BLD-MCNC: 12.8 G/DL (ref 11.5–15.5)
IMM GRANULOCYTES # BLD AUTO: <0.03 K/UL (ref 0–0.58)
IMM GRANULOCYTES NFR BLD: 0 % (ref 0–5)
INR PPP: 1.1
LYMPHOCYTES NFR BLD: 1.9 K/UL (ref 1.5–4)
LYMPHOCYTES RELATIVE PERCENT: 26 % (ref 20–42)
MCH RBC QN AUTO: 31.1 PG (ref 26–35)
MCHC RBC AUTO-ENTMCNC: 32.1 G/DL (ref 32–34.5)
MCV RBC AUTO: 97.1 FL (ref 80–99.9)
MONOCYTES NFR BLD: 0.91 K/UL (ref 0.1–0.95)
MONOCYTES NFR BLD: 13 % (ref 2–12)
NEUTROPHILS NFR BLD: 57 % (ref 43–80)
NEUTS SEG NFR BLD: 4.19 K/UL (ref 1.8–7.3)
PHOSPHATE SERPL-MCNC: 4 MG/DL (ref 2.5–4.5)
PLATELET, FLUORESCENCE: 215 K/UL (ref 130–450)
PMV BLD AUTO: 10.3 FL (ref 7–12)
POTASSIUM SERPL-SCNC: 4.3 MMOL/L (ref 3.5–5)
PROTHROMBIN TIME: 11.4 SEC (ref 9.3–12.4)
RBC # BLD AUTO: 4.11 M/UL (ref 3.5–5.5)
SODIUM SERPL-SCNC: 140 MMOL/L (ref 132–146)
TSH SERPL DL<=0.05 MIU/L-ACNC: 2.01 UIU/ML (ref 0.27–4.2)
VIT B12 SERPL-MCNC: 396 PG/ML (ref 232–1245)
WBC OTHER # BLD: 7.3 K/UL (ref 4.5–11.5)

## 2025-05-31 PROCEDURE — 96372 THER/PROPH/DIAG INJ SC/IM: CPT

## 2025-05-31 PROCEDURE — 84100 ASSAY OF PHOSPHORUS: CPT

## 2025-05-31 PROCEDURE — 85025 COMPLETE CBC W/AUTO DIFF WBC: CPT

## 2025-05-31 PROCEDURE — 93246 EXT ECG>7D<15D RECORDING: CPT

## 2025-05-31 PROCEDURE — 6370000000 HC RX 637 (ALT 250 FOR IP): Performed by: PSYCHIATRY & NEUROLOGY

## 2025-05-31 PROCEDURE — 6360000002 HC RX W HCPCS: Performed by: INTERNAL MEDICINE

## 2025-05-31 PROCEDURE — 82746 ASSAY OF FOLIC ACID SERUM: CPT

## 2025-05-31 PROCEDURE — 80048 BASIC METABOLIC PNL TOTAL CA: CPT

## 2025-05-31 PROCEDURE — 36415 COLL VENOUS BLD VENIPUNCTURE: CPT

## 2025-05-31 PROCEDURE — 2500000003 HC RX 250 WO HCPCS: Performed by: INTERNAL MEDICINE

## 2025-05-31 PROCEDURE — 85610 PROTHROMBIN TIME: CPT

## 2025-05-31 PROCEDURE — 99238 HOSP IP/OBS DSCHRG MGMT 30/<: CPT | Performed by: FAMILY MEDICINE

## 2025-05-31 PROCEDURE — 97161 PT EVAL LOW COMPLEX 20 MIN: CPT | Performed by: PHYSICAL THERAPIST

## 2025-05-31 PROCEDURE — G0378 HOSPITAL OBSERVATION PER HR: HCPCS

## 2025-05-31 PROCEDURE — 6370000000 HC RX 637 (ALT 250 FOR IP): Performed by: INTERNAL MEDICINE

## 2025-05-31 PROCEDURE — 84443 ASSAY THYROID STIM HORMONE: CPT

## 2025-05-31 PROCEDURE — 82607 VITAMIN B-12: CPT

## 2025-05-31 RX ORDER — CLOPIDOGREL BISULFATE 75 MG/1
75 TABLET ORAL DAILY
Status: DISCONTINUED | OUTPATIENT
Start: 2025-05-31 | End: 2025-05-31 | Stop reason: HOSPADM

## 2025-05-31 RX ORDER — ATORVASTATIN CALCIUM 40 MG/1
80 TABLET, FILM COATED ORAL NIGHTLY
Status: DISCONTINUED | OUTPATIENT
Start: 2025-05-31 | End: 2025-05-31 | Stop reason: HOSPADM

## 2025-05-31 RX ORDER — LOSARTAN POTASSIUM 100 MG/1
100 TABLET ORAL DAILY
COMMUNITY
Start: 2025-05-28

## 2025-05-31 RX ORDER — CLOPIDOGREL BISULFATE 75 MG/1
75 TABLET ORAL DAILY
Qty: 90 TABLET | Refills: 0 | Status: SHIPPED | OUTPATIENT
Start: 2025-06-01

## 2025-05-31 RX ORDER — ATORVASTATIN CALCIUM 40 MG/1
40 TABLET, FILM COATED ORAL NIGHTLY
Qty: 90 TABLET | Refills: 0 | Status: SHIPPED | OUTPATIENT
Start: 2025-05-31

## 2025-05-31 RX ADMIN — Medication 10 ML: at 08:46

## 2025-05-31 RX ADMIN — PANTOPRAZOLE SODIUM 40 MG: 40 TABLET, DELAYED RELEASE ORAL at 05:16

## 2025-05-31 RX ADMIN — GABAPENTIN 300 MG: 300 CAPSULE ORAL at 08:46

## 2025-05-31 RX ADMIN — CLOPIDOGREL BISULFATE 75 MG: 75 TABLET, FILM COATED ORAL at 10:30

## 2025-05-31 RX ADMIN — POLYETHYLENE GLYCOL 3350 17 G: 17 POWDER, FOR SOLUTION ORAL at 08:53

## 2025-05-31 RX ADMIN — ENOXAPARIN SODIUM 40 MG: 100 INJECTION SUBCUTANEOUS at 08:46

## 2025-05-31 RX ADMIN — Medication 10 ML: at 00:07

## 2025-05-31 ASSESSMENT — ENCOUNTER SYMPTOMS
ABDOMINAL PAIN: 0
COUGH: 0
DIARRHEA: 0
NAUSEA: 0
VOMITING: 0
CONSTIPATION: 0
SHORTNESS OF BREATH: 0

## 2025-05-31 NOTE — CONSULTS
History Of Present Illness: History Of Present Illness:    Ms. Dianne Narayan, a 89 y.o. year old female  who  has a past medical history of Asthma, Brain tumor (benign) (HCC), GERD (gastroesophageal reflux disease), Glaucoma, Hypertension, Kidney stone, Thrombocytopenia, and Trigeminal neuralgia.  Who presented emergency department with expressive aphasia.  Patient stated yesterday she was talking to a clinic staff member from her PCP, when she suddenly had expressive aphasia for approximately 30 minutes lasting between 12 PM to 12:30 PM yesterday.  She knew what she wanted to say but not able to express it, the staff member checked her blood pressure multiple times, which was normal, she denied any other neurological symptoms including dysphagia, visual changes, lightheadedness, dizziness, syncopal episode, palpitations, fever, chills, urinary symptoms, cough, chest pain.     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 Full code    The patient is a 89 y.o. female with significant past medical history of see below who presents with above.      The patient has the following symptoms:    Change in level of consciousness: alert    New Weakness: no    Numbness or Tingling: no    Difficulty Swallowing: no    Current Medications:   Scheduled Meds:   gabapentin  300 mg Oral BID    latanoprost  1 drop Both Eyes Nightly    pantoprazole  40 mg Oral QAM AC    sodium chloride flush  5-40 mL IntraVENous 2 times per day    enoxaparin  40 mg SubCUTAneous Daily     Continuous Infusions:   sodium chloride       PRN Meds:bisacodyl, sodium chloride flush, sodium chloride, potassium chloride **OR** potassium alternative oral replacement **OR** potassium chloride, magnesium sulfate, ondansetron **OR**

## 2025-05-31 NOTE — PROGRESS NOTES
Physical Therapy Initial Evaluation/Plan of Care    Room #:  0437/0437-01  Patient Name: Dianne Jaramillo  YOB: 1935  MRN: [id number]    Date of Service: 5/31/2025     Tentative placement recommendation: Home with Home Health Physical Therapy  Equipment recommendation: Patient has needed equipment       Evaluating Physical Therapist: Jessa Villafuerte, PT #[id number]      Specific Provider Orders/Date/Referring Provider :  05/30/25 2045    PT evaluation and treat  Start:  05/30/25 2045,   End:  05/30/25 2045,   ONE TIME,   Standing Count:  1 Occurrences,   R       Dayday, Clinton, DO    Admitting Diagnosis:   TIA (transient ischemic attack) [G45.9]     Difficulty speaking (Patient sent in by PCP yesterday due to having difficulty answering questions, symptoms have resolved). expressive aphasia for approximately 30 minutes lasting between 12 PM to 12:30 PM yesterday.  She knew what she wanted to say but not able to express it, the staff member checked her blood pressure multiple times, which was normal  Surgery: none      Patient Active Problem List   Diagnosis    Sepsis (HCC)    Diarrhea    Kidney stone    Ureterolithiasis    Acute kidney injury    Lactic acid acidosis    Elevated troponin level not due myocardial infarction    Transaminitis    Bandemia    Thrombocytopenia    Intermittent asthma with acute exacerbation    Glaucoma    Benign neoplasm of brain (HCC)    Trigeminal neuralgia    Acute renal insufficiency    TIA (transient ischemic attack)        ASSESSMENT of Current Deficits Patient exhibits decreased strength, balance, and endurance impairing functional mobility, transfers, gait , gait distance, and tolerance to activity unsteady gait, trendelenburg left, improved balance/stability and safety with wheeled walker Patient requires continued skilled physical therapy to address concerns listed above for increased safety and function at discharge.         PHYSICAL THERAPY  PLAN OF CARE  without using bedrails?: None  How much help is needed moving from lying on your back to sitting on the side of a flat bed without using bedrails?: A Little  How much help is needed moving to and from a bed to a chair?: A Little  How much help is needed standing up from a chair using your arms?: A Little  How much help is needed walking in hospital room?: A Little  How much help is needed climbing 3-5 steps with a railing?: A Little  AM-[facility] Inpatient Mobility Raw Score : 19  AM-[facility] Inpatient T-Scale Score : 45.44  Mobility Inpatient CMS 0-100% Score: 41.77  Mobility Inpatient CMS G-Code Modifier : CK    Nursing cleared patient for PT evaluation. The admitting diagnosis and active problem list as listed above have been reviewed prior to the initiation of this evaluation.    OBJECTIVE:   Initial Evaluation  Date: 5/31/2025 Treatment Date:     Short Term/ Long Term   Goals   Was pt agreeable to Eval/treatment? Yes  To be met in 3 days   Pain level   0/10        Bed Mobility  Using rails and head of bed elevated:       Rolling: Independent    Supine to sit: Supervision     Sit to supine: Not assessed     Scooting: Supervision     Rolling: Independent    Supine to sit: Independent    Sit to supine: Independent    Scooting: Independent     Transfers Sit to stand: Supervision  Cues for hand placement and safety   Sit to stand: Independent     Ambulation     1 x 20 feet hand held assist, 2 x 50 feet using  wheeled walker with Minimal progressing to supervision   for balance, Patient with trendelenburg gait left lower extremity and unsteady gait, no loss of balance, and cues for upright posture and safety    100 feet using  least restrictive device versus no device with Modified Independent      Stair negotiation: ascended and descended   Not assessed     10 steps, with rail, Supervision        ROM Within functional limits    Increase range of motion 10% of affected joints    Strength BUE:  refer to OT eval  RLE:  4-/5  LLE:

## 2025-05-31 NOTE — H&P
[facility] Hospitalist Group History and Physical          PCP: Velasquez Marshall MD    Date of Admission: 5/30/2025    Date of Service: Pt seen/examined on 5/30/2025 and is admitted to Inpatient with expected LOS greater than two midnights due to medical therapy. Placed in Observation.    Chief Complaint:  had concerns including Difficulty speaking (Patient sent in by PCP yesterday due to having difficulty answering questions, symptoms have resolved).    History Of Present Illness:    Ms. Dianne Narayan, a 89 y.o. year old female  who  has a past medical history of Asthma, Brain tumor (benign) (HCC), GERD (gastroesophageal reflux disease), Glaucoma, Hypertension, Kidney stone, Thrombocytopenia, and Trigeminal neuralgia.  Who presented emergency department with expressive aphasia.  Patient stated yesterday she was talking to a clinic staff member from her PCP, when she suddenly had expressive aphasia for approximately 30 minutes lasting between 12 PM to 12:30 PM yesterday.  She knew what she wanted to say but not able to express it, the staff member checked her blood pressure multiple times, which was normal, she denied any other neurological symptoms including dysphagia, visual changes, lightheadedness, dizziness, syncopal episode, palpitations, fever, chills, urinary symptoms, cough, chest pain.    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 Full code    Past Medical History:        Diagnosis Date    Asthma     Brain tumor (benign) (HCC)     colloid cyst    GERD (gastroesophageal reflux disease)     Glaucoma     Hypertension     Kidney stone     Thrombocytopenia     Trigeminal neuralgia        Past Surgical History:        Procedure Laterality Date    APPENDECTOMY

## 2025-05-31 NOTE — DISCHARGE INSTRUCTIONS
Your information:  Name: Dianne Narayan  : 1935    Your instructions:    YOU ARE BEING DISCHARGED HOME. PLEASE MAKE AND KEEP YOUR FOLLOW UP APPOINTMENTS.    What to do after you leave the hospital:    Recommended diet: regular diet    Recommended activity: activity as tolerated    IF YOU EXPERIENCE ANY OF THE FOLLOWING SYMPTOMS, CHEST PAIN, SHORTNESS OF BREATH, COUGHING UP BLOOD OR BLOODY SPUTUM, STOMACH PAIN OR CRAMPING, DARK, TARRY STOOLS, LOSS OF APPETITE, GENERAL NOT FEELING WELL, SIGNS AND SYMPTOMS OF INFECTION LIKE FEVER AND OR CHILLS, PLEASE CALL Velasquez Mederos MD OR RETURN TO THE EMERGENCY ROOM.    The following personal items were collected during your admission and were returned to you:    Belongings  Dental Appliances: None  Vision - Corrective Lenses: Eyeglasses, Other (Comment) (At home)  Hearing Aid: Bilateral hearing aids  Clothing: Footwear, Pants, Shirt, Socks, Undergarments, At bedside  Jewelry: Ring  Electronic Devices: None  Weapons (Notify Protective Services/Security): None  Home Medications: None  Valuables Given To: Patient  Provide Name(s) of Who Valuable(s) Were Given To: Dianne Narayan    Information obtained by:  By signing below, I understand that if any problems occur once I leave the hospital I am to contact  Velasquez Marshall MD or go to emergency room.  I understand and acknowledge receipt of the instructions indicated above.

## 2025-05-31 NOTE — DISCHARGE INSTR - DIET
Good nutrition is important when healing from an illness, injury, or surgery.  Follow any nutrition recommendations given to you during your hospital stay.   If you were given an oral nutrition supplement while in the hospital, continue to take this supplement at home.  You can take it with meals, in-between meals, and/or before bedtime. These supplements can be purchased at most local grocery stores, pharmacies, and chain [employer]stores.   If you have any questions about your diet or nutrition, call the hospital and ask for the dietitian.      Low cholesterol/low salt diet

## 2025-05-31 NOTE — PROGRESS NOTES
4 Eyes Skin Assessment     NAME:  Dianne Narayan  YOB: 1935  MEDICAL RECORD NUMBER:  [id number]    The patient is being assessed for  Admission    I agree that at least one RN has performed a thorough Head to Toe Skin Assessment on the patient. ALL assessment sites listed below have been assessed.      Areas assessed by both nurses:    Head, Face, Ears, Shoulders, Back, Chest, Arms, Elbows, Hands, Sacrum. Buttock, Coccyx, Ischium, and Legs. Feet and Heels        Does the Patient have a Wound? No noted wound(s)       Lencho Prevention initiated by RN: No  Wound Care Orders initiated by RN: No    Pressure Injury (Stage 3,4, Unstageable, DTI, NWPT, and Complex wounds) if present, place Wound referral order by RN under : No    New Ostomies, if present place, Ostomy referral order under : No     Nurse 1 eSignature: Electronically signed by Melissa Liang RN on 5/31/25 at 1:14 AM EDT    **SHARE this note so that the co-signing nurse can place an eSignature**    Nurse 2 eSignature: Electronically signed by Ileana Lerma RN on 5/31/25 at 4:29 AM EDT

## 2025-06-01 LAB
EKG ATRIAL RATE: 65 BPM
EKG P AXIS: 60 DEGREES
EKG P-R INTERVAL: 142 MS
EKG Q-T INTERVAL: 418 MS
EKG QRS DURATION: 74 MS
EKG QTC CALCULATION (BAZETT): 434 MS
EKG R AXIS: -7 DEGREES
EKG T AXIS: 51 DEGREES
EKG VENTRICULAR RATE: 65 BPM

## 2025-06-01 NOTE — DISCHARGE SUMMARY
[facility] Hospitalist       Hospitalist Physician Discharge Summary       Velasquez Marshall MD  [address] [place] [address]  [phone]    Go on 6/20/2025        Activity level: up as tolerated    Diet: low cholesterol/DASH type of diet    Labs:  None  Fasting lipid panel in 3 months    Condition at discharge: good/stable    Dispo:  To home without services      Patient ID:  Dianne Narayan  [id number]  89 y.o.  1935    Admit date: 5/30/2025    Discharge date:  5/31/2025    Admission Diagnoses: Principal Problem:    TIA (transient ischemic attack)  Resolved Problems:    * No resolved hospital problems. *      Discharge Diagnoses: Principal Problem:    TIA (transient ischemic attack)  Resolved Problems:    * No resolved hospital problems. *      Consults:  IP CONSULT TO NEUROLOGY    Procedures:   CT of head    Carotid dopplers bilateral    Hospital Course:   89 yr old female with a past medical history of asthma, brain tumor (benign), GERD, hypertension, renal stone, thrombocytopenia, trigeminal neuralgia, glaucoma, aspirin allergy presented to the ER for 15 to 20 minutes of expressive aphasia; was resolved once she got to the ER.  CT in the ER was negative.  Placed in OBS status.  Blood pressure was normal upon arrival.     Has been taking Cozaar -- this was held.  BP's increased to 170's.  Will restart this for discharge.    Carotid dopplers showed mild disease bilaterally, otherwise flow is normal, no significant stenosis.    Seen by neurology -- Dr. Nunez -- recommended antiplatelet therapy, control BP, and statin.  Plavix and lipitor started (patient has an aspirin allergy).  Needs to follow up with PCP and neurology in a month.    [facility] ordered to screen for occult atrial fibrillation/flutter -- she had frequent premature atrial contractions, mainly couplets.  Magnesium level was OK.  Potassium level OK.    At the time of discharge, the patient denies complaints.

## 2025-06-02 PROCEDURE — 93010 ELECTROCARDIOGRAM REPORT: CPT | Performed by: INTERNAL MEDICINE

## 2025-06-05 ENCOUNTER — TRANSCRIBE ORDERS (OUTPATIENT)
Dept: ADMINISTRATIVE | Age: 89
End: 2025-06-05

## 2025-06-05 DIAGNOSIS — N20.0 CALCULUS OF KIDNEY: Primary | ICD-10-CM

## 2025-07-10 ENCOUNTER — TELEPHONE (OUTPATIENT)
Dept: AUDIOLOGY | Age: 89
End: 2025-07-10

## 2025-07-10 DIAGNOSIS — G45.9 TIA (TRANSIENT ISCHEMIC ATTACK): ICD-10-CM

## 2025-07-21 ENCOUNTER — PROCEDURE VISIT (OUTPATIENT)
Dept: AUDIOLOGY | Age: 89
End: 2025-07-21
Payer: MEDICARE

## 2025-07-21 ENCOUNTER — TELEPHONE (OUTPATIENT)
Dept: ENT CLINIC | Age: 89
End: 2025-07-21

## 2025-07-21 DIAGNOSIS — H61.22 CERUMEN DEBRIS ON TYMPANIC MEMBRANE OF LEFT EAR: Primary | ICD-10-CM

## 2025-07-21 PROCEDURE — 92567 TYMPANOMETRY: CPT | Performed by: AUDIOLOGIST

## 2025-07-21 NOTE — TELEPHONE ENCOUNTER
----- Message from [facility] sent at 7/21/2025 10:33 AM EDT -----  Patient needs scheduled for cerumen removal then audiology for hearing test.  She would prefer later in the afternoon.   TY

## 2025-07-21 NOTE — PROGRESS NOTES
This patient was referred for audiometric and  tympanometric testing due to hearing loss.   Testing today was to monitor hearing sensitivity.      Otoscopic inspection revealed excess wax in the left ear.  ENT referral to [facility] was placed in [facility].      Hearing test not completed due to excess wax, will complete after cerumen removal.      Tympanometry revealed normal middle ear peak pressure and compliance, in the right ear and flat in the left ear.     The results were reviewed with the patient and ordering provider.     Recommend ENT referral for cerumen management.    [facility] discussed hearing aids and hearing aids outside benefit sheet.      Electronically signed by Jeronimo Lozada on 7/21/2025 at 10:25 AM

## 2025-08-01 ENCOUNTER — HOSPITAL ENCOUNTER (OUTPATIENT)
Dept: ULTRASOUND IMAGING | Age: 89
Discharge: HOME | End: 2025-08-01
Attending: UROLOGY
Payer: MEDICARE

## 2025-08-01 ENCOUNTER — HOSPITAL ENCOUNTER (OUTPATIENT)
Dept: GENERAL RADIOLOGY | Age: 89
End: 2025-08-01
Attending: UROLOGY
Payer: MEDICARE

## 2025-08-01 DIAGNOSIS — N20.0 CALCULUS OF KIDNEY: ICD-10-CM

## 2025-08-01 PROCEDURE — 76770 US EXAM ABDO BACK WALL COMP: CPT

## 2025-08-01 PROCEDURE — 74018 RADEX ABDOMEN 1 VIEW: CPT

## (undated) DEVICE — GAUZE,SPONGE,4"X4",8PLY,STRL,LF,10/TRAY: Brand: MEDLINE

## (undated) DEVICE — SYRINGE MED 20ML STD CLR PLAS LUERSLIP TIP N CTRL DISP

## (undated) DEVICE — SOLUTION IRRIG 1000ML STRL H2O USP PLAS POUR BTL

## (undated) DEVICE — SOL IRR SOD CHL 0.9% TITAN XL CNTNR 3000ML

## (undated) DEVICE — HOSE CONN FOR WST MGMT SYS NEPTUNE 2

## (undated) DEVICE — BAG DRNGE COMB PK

## (undated) DEVICE — WIPES SKIN CLOTH READYPREP 9 X 10.5 IN 2% CHLORHEX GLUCONATE CHG PREOP

## (undated) DEVICE — CATHETER URET 5FR L70CM OPN END SGL LUMN INJ HUB FLEXIMA

## (undated) DEVICE — SOLUTION IRRIG 3000ML STRL H2O USP UROMATIC PLAS CONT

## (undated) DEVICE — URETERAL ACCESS SHEATH SET: Brand: NAVIGATOR HD

## (undated) DEVICE — SOLUTION IRRIG 1000ML H2O PIC PLAS SHATTERPROOF CONTAINER

## (undated) DEVICE — CATHETER URETH 16FR BLLN 5CC SIL HYDRGEL 2 W F LUBRICIOUS

## (undated) DEVICE — TOWEL,OR,DSP,ST,BLUE,STD,6/PK,12PK/CS: Brand: MEDLINE

## (undated) DEVICE — 4-PORT MANIFOLD: Brand: NEPTUNE 2

## (undated) DEVICE — DRAINBAG,ANTI-REFLUX TOWER,L/F,2000ML,LL: Brand: MEDLINE

## (undated) DEVICE — TUBING, SUCTION, 3/16" X 12', STRAIGHT: Brand: MEDLINE

## (undated) DEVICE — FLEXIVA  PULSE  AND  FLEXIVA  PULSE  TRACTIP  LASER  FIBERS  ARE  HIGH  POWER  SINGLE-USE FIBER: Brand: FLEXIVA PULSE ID

## (undated) DEVICE — SOLUTION SCRB 4OZ 4% CHG H2O AIDED FOR PREOPERATIVE SKIN

## (undated) DEVICE — GLOVE ORANGE PI 7 1/2   MSG9075

## (undated) DEVICE — CYSTO PACK: Brand: MEDLINE INDUSTRIES, INC.

## (undated) DEVICE — GOWN,SIRUS,NONRNF,SETINSLV,XL,20/CS: Brand: MEDLINE

## (undated) DEVICE — SYRINGE MED 10ML LUERLOCK TIP W/O SFTY DISP

## (undated) DEVICE — GARMENT,MEDLINE,DVT,INT,CALF,MED, GEN2: Brand: MEDLINE

## (undated) DEVICE — TUBING, SUCTION, 1/4" X 10', STRAIGHT: Brand: MEDLINE

## (undated) DEVICE — SYRINGE MED 30ML STD CLR PLAS LUERLOCK TIP N CTRL DISP

## (undated) DEVICE — BASIC SINGLE BASIN 1-LF: Brand: MEDLINE INDUSTRIES, INC.

## (undated) DEVICE — GUIDEWIRE ENDOSCP L150CM DIA0.035IN TIP L15CM BENT PTFE

## (undated) DEVICE — GAUZE,SPONGE,4"X4",16PLY,XRAY,STRL,LF: Brand: MEDLINE

## (undated) DEVICE — GOWN,SIRUS,FABRNF,XL,20/CS: Brand: MEDLINE

## (undated) DEVICE — SOLUTION IV 1000 ML 0.9 NACL INJ USP EXCEL PLAS CONTAINER

## (undated) DEVICE — SOLUTION SCRB 4OZ 2% CHG FOR SURG SCRBBING HND WSH

## (undated) DEVICE — GUIDEWIRE ENDOSCP L150CM DIA0.035IN TIP 3CM PTFE NIT